# Patient Record
Sex: FEMALE | Race: BLACK OR AFRICAN AMERICAN | NOT HISPANIC OR LATINO | Employment: FULL TIME | URBAN - METROPOLITAN AREA
[De-identification: names, ages, dates, MRNs, and addresses within clinical notes are randomized per-mention and may not be internally consistent; named-entity substitution may affect disease eponyms.]

---

## 2017-05-08 PROBLEM — Z98.890 S/P TENDON REPAIR: Status: ACTIVE | Noted: 2017-05-08

## 2021-01-04 ENCOUNTER — OFFICE VISIT (OUTPATIENT)
Dept: INTERNAL MEDICINE | Facility: CLINIC | Age: 51
End: 2021-01-04
Payer: COMMERCIAL

## 2021-01-04 ENCOUNTER — PATIENT MESSAGE (OUTPATIENT)
Dept: INTERNAL MEDICINE | Facility: CLINIC | Age: 51
End: 2021-01-04

## 2021-01-04 VITALS
SYSTOLIC BLOOD PRESSURE: 131 MMHG | OXYGEN SATURATION: 98 % | HEART RATE: 72 BPM | RESPIRATION RATE: 16 BRPM | HEIGHT: 72 IN | TEMPERATURE: 100 F | DIASTOLIC BLOOD PRESSURE: 90 MMHG | WEIGHT: 192 LBS | BODY MASS INDEX: 26.01 KG/M2

## 2021-01-04 DIAGNOSIS — J45.30 MILD PERSISTENT ASTHMA WITHOUT COMPLICATION: ICD-10-CM

## 2021-01-04 DIAGNOSIS — I10 HYPERTENSION, UNSPECIFIED TYPE: Primary | ICD-10-CM

## 2021-01-04 PROCEDURE — 3008F BODY MASS INDEX DOCD: CPT | Mod: CPTII,S$GLB,, | Performed by: NURSE PRACTITIONER

## 2021-01-04 PROCEDURE — 3075F PR MOST RECENT SYSTOLIC BLOOD PRESS GE 130-139MM HG: ICD-10-PCS | Mod: CPTII,S$GLB,, | Performed by: NURSE PRACTITIONER

## 2021-01-04 PROCEDURE — 99204 PR OFFICE/OUTPT VISIT, NEW, LEVL IV, 45-59 MIN: ICD-10-PCS | Mod: S$GLB,,, | Performed by: NURSE PRACTITIONER

## 2021-01-04 PROCEDURE — 3080F PR MOST RECENT DIASTOLIC BLOOD PRESSURE >= 90 MM HG: ICD-10-PCS | Mod: CPTII,S$GLB,, | Performed by: NURSE PRACTITIONER

## 2021-01-04 PROCEDURE — 3075F SYST BP GE 130 - 139MM HG: CPT | Mod: CPTII,S$GLB,, | Performed by: NURSE PRACTITIONER

## 2021-01-04 PROCEDURE — 99999 PR PBB SHADOW E&M-NEW PATIENT-LVL V: ICD-10-PCS | Mod: PBBFAC,,, | Performed by: NURSE PRACTITIONER

## 2021-01-04 PROCEDURE — 99999 PR PBB SHADOW E&M-NEW PATIENT-LVL V: CPT | Mod: PBBFAC,,, | Performed by: NURSE PRACTITIONER

## 2021-01-04 PROCEDURE — 99204 OFFICE O/P NEW MOD 45 MIN: CPT | Mod: S$GLB,,, | Performed by: NURSE PRACTITIONER

## 2021-01-04 PROCEDURE — 3008F PR BODY MASS INDEX (BMI) DOCUMENTED: ICD-10-PCS | Mod: CPTII,S$GLB,, | Performed by: NURSE PRACTITIONER

## 2021-01-04 PROCEDURE — 3080F DIAST BP >= 90 MM HG: CPT | Mod: CPTII,S$GLB,, | Performed by: NURSE PRACTITIONER

## 2021-01-04 RX ORDER — IBUPROFEN 800 MG/1
TABLET ORAL
COMMUNITY
End: 2021-01-26

## 2021-01-04 RX ORDER — LISINOPRIL 20 MG/1
TABLET ORAL
COMMUNITY
End: 2021-01-04 | Stop reason: SDUPTHER

## 2021-01-04 RX ORDER — FUROSEMIDE 20 MG/1
20 TABLET ORAL
COMMUNITY
Start: 2020-12-03

## 2021-01-04 RX ORDER — LISINOPRIL 20 MG/1
20 TABLET ORAL DAILY
Qty: 90 TABLET | Refills: 1 | Status: SHIPPED | OUTPATIENT
Start: 2021-01-04 | End: 2022-04-05 | Stop reason: ALTCHOICE

## 2021-01-04 RX ORDER — ALBUTEROL SULFATE 90 UG/1
2 AEROSOL, METERED RESPIRATORY (INHALATION) EVERY 6 HOURS PRN
Qty: 18 G | Refills: 2 | Status: SHIPPED | OUTPATIENT
Start: 2021-01-04 | End: 2022-04-05 | Stop reason: SDUPTHER

## 2021-01-04 RX ORDER — NIFEDIPINE 30 MG/1
90 TABLET, EXTENDED RELEASE ORAL DAILY
Qty: 90 TABLET | Refills: 1 | Status: SHIPPED | OUTPATIENT
Start: 2021-01-04 | End: 2022-04-05

## 2021-01-04 RX ORDER — CYCLOBENZAPRINE HCL 10 MG
TABLET ORAL
COMMUNITY
End: 2021-01-26

## 2021-01-26 ENCOUNTER — OFFICE VISIT (OUTPATIENT)
Dept: INTERNAL MEDICINE | Facility: CLINIC | Age: 51
End: 2021-01-26
Payer: COMMERCIAL

## 2021-01-26 VITALS
OXYGEN SATURATION: 99 % | HEART RATE: 79 BPM | DIASTOLIC BLOOD PRESSURE: 102 MMHG | BODY MASS INDEX: 26.49 KG/M2 | TEMPERATURE: 98 F | WEIGHT: 195.31 LBS | SYSTOLIC BLOOD PRESSURE: 146 MMHG

## 2021-01-26 DIAGNOSIS — I16.0 HYPERTENSIVE URGENCY: Primary | ICD-10-CM

## 2021-01-26 DIAGNOSIS — Z29.9 PREVENTIVE MEASURE: ICD-10-CM

## 2021-01-26 DIAGNOSIS — M50.30 DDD (DEGENERATIVE DISC DISEASE), CERVICAL: ICD-10-CM

## 2021-01-26 DIAGNOSIS — Z98.1 STATUS POST CERVICAL SPINAL FUSION: ICD-10-CM

## 2021-01-26 DIAGNOSIS — M75.51 BILATERAL SHOULDER BURSITIS: ICD-10-CM

## 2021-01-26 DIAGNOSIS — J45.30 MILD PERSISTENT ASTHMA WITHOUT COMPLICATION: ICD-10-CM

## 2021-01-26 DIAGNOSIS — M75.52 BILATERAL SHOULDER BURSITIS: ICD-10-CM

## 2021-01-26 DIAGNOSIS — I10 ESSENTIAL HYPERTENSION: ICD-10-CM

## 2021-01-26 PROCEDURE — 99999 PR PBB SHADOW E&M-EST. PATIENT-LVL V: CPT | Mod: PBBFAC,,, | Performed by: FAMILY MEDICINE

## 2021-01-26 PROCEDURE — 99214 OFFICE O/P EST MOD 30 MIN: CPT | Mod: S$GLB,,, | Performed by: FAMILY MEDICINE

## 2021-01-26 PROCEDURE — 1125F AMNT PAIN NOTED PAIN PRSNT: CPT | Mod: S$GLB,,, | Performed by: FAMILY MEDICINE

## 2021-01-26 PROCEDURE — 99999 PR PBB SHADOW E&M-EST. PATIENT-LVL V: ICD-10-PCS | Mod: PBBFAC,,, | Performed by: FAMILY MEDICINE

## 2021-01-26 PROCEDURE — 3008F PR BODY MASS INDEX (BMI) DOCUMENTED: ICD-10-PCS | Mod: CPTII,S$GLB,, | Performed by: FAMILY MEDICINE

## 2021-01-26 PROCEDURE — 3080F PR MOST RECENT DIASTOLIC BLOOD PRESSURE >= 90 MM HG: ICD-10-PCS | Mod: CPTII,S$GLB,, | Performed by: FAMILY MEDICINE

## 2021-01-26 PROCEDURE — 3080F DIAST BP >= 90 MM HG: CPT | Mod: CPTII,S$GLB,, | Performed by: FAMILY MEDICINE

## 2021-01-26 PROCEDURE — 1125F PR PAIN SEVERITY QUANTIFIED, PAIN PRESENT: ICD-10-PCS | Mod: S$GLB,,, | Performed by: FAMILY MEDICINE

## 2021-01-26 PROCEDURE — 3077F PR MOST RECENT SYSTOLIC BLOOD PRESSURE >= 140 MM HG: ICD-10-PCS | Mod: CPTII,S$GLB,, | Performed by: FAMILY MEDICINE

## 2021-01-26 PROCEDURE — 3077F SYST BP >= 140 MM HG: CPT | Mod: CPTII,S$GLB,, | Performed by: FAMILY MEDICINE

## 2021-01-26 PROCEDURE — 3008F BODY MASS INDEX DOCD: CPT | Mod: CPTII,S$GLB,, | Performed by: FAMILY MEDICINE

## 2021-01-26 PROCEDURE — 99214 PR OFFICE/OUTPT VISIT, EST, LEVL IV, 30-39 MIN: ICD-10-PCS | Mod: S$GLB,,, | Performed by: FAMILY MEDICINE

## 2021-01-26 RX ORDER — MELOXICAM 15 MG/1
TABLET ORAL
COMMUNITY
End: 2023-09-12

## 2021-01-26 RX ORDER — CLOTRIMAZOLE AND BETAMETHASONE DIPROPIONATE 10; .64 MG/G; MG/G
CREAM TOPICAL
COMMUNITY
Start: 2020-12-03

## 2021-01-26 RX ORDER — MUPIROCIN 20 MG/G
OINTMENT TOPICAL
COMMUNITY
End: 2023-10-24 | Stop reason: ALTCHOICE

## 2021-01-29 ENCOUNTER — HOSPITAL ENCOUNTER (OUTPATIENT)
Dept: RADIOLOGY | Facility: HOSPITAL | Age: 51
Discharge: HOME | End: 2021-01-29
Attending: FAMILY MEDICINE
Payer: COMMERCIAL

## 2021-01-29 ENCOUNTER — OFFICE VISIT (OUTPATIENT)
Dept: CARDIOLOGY | Facility: CLINIC | Age: 51
End: 2021-01-29
Payer: COMMERCIAL

## 2021-01-29 ENCOUNTER — LAB VISIT (OUTPATIENT)
Dept: LAB | Facility: HOSPITAL | Age: 51
End: 2021-01-29
Payer: COMMERCIAL

## 2021-01-29 ENCOUNTER — HOSPITAL ENCOUNTER (OUTPATIENT)
Dept: CARDIOLOGY | Facility: HOSPITAL | Age: 51
Discharge: HOME | End: 2021-01-29
Payer: COMMERCIAL

## 2021-01-29 VITALS
OXYGEN SATURATION: 100 % | SYSTOLIC BLOOD PRESSURE: 122 MMHG | BODY MASS INDEX: 26.41 KG/M2 | HEART RATE: 79 BPM | DIASTOLIC BLOOD PRESSURE: 84 MMHG | WEIGHT: 195 LBS | HEIGHT: 72 IN

## 2021-01-29 DIAGNOSIS — I16.0 HYPERTENSIVE URGENCY: ICD-10-CM

## 2021-01-29 DIAGNOSIS — Z29.9 PREVENTIVE MEASURE: ICD-10-CM

## 2021-01-29 DIAGNOSIS — I10 ESSENTIAL HYPERTENSION: ICD-10-CM

## 2021-01-29 DIAGNOSIS — J45.30 MILD PERSISTENT ASTHMA WITHOUT COMPLICATION: ICD-10-CM

## 2021-01-29 DIAGNOSIS — R07.9 CHEST PAIN SYNDROME: Primary | ICD-10-CM

## 2021-01-29 LAB
BASOPHILS # BLD AUTO: 0.08 K/UL (ref 0–0.2)
BASOPHILS NFR BLD: 1.1 % (ref 0–1.9)
BILIRUB UR QL STRIP: NEGATIVE
CLARITY UR: CLEAR
COLOR UR: YELLOW
DIFFERENTIAL METHOD: ABNORMAL
EOSINOPHIL # BLD AUTO: 0 K/UL (ref 0–0.5)
EOSINOPHIL NFR BLD: 0.4 % (ref 0–8)
ERYTHROCYTE [DISTWIDTH] IN BLOOD BY AUTOMATED COUNT: 13.2 % (ref 11.5–14.5)
GLUCOSE UR QL STRIP: NEGATIVE
HCT VFR BLD AUTO: 40.6 % (ref 37–48.5)
HGB BLD-MCNC: 13.4 G/DL (ref 12–16)
HGB UR QL STRIP: ABNORMAL
IMM GRANULOCYTES # BLD AUTO: 0.01 K/UL (ref 0–0.04)
IMM GRANULOCYTES NFR BLD AUTO: 0.1 % (ref 0–0.5)
KETONES UR QL STRIP: NEGATIVE
LEUKOCYTE ESTERASE UR QL STRIP: NEGATIVE
LYMPHOCYTES # BLD AUTO: 2 K/UL (ref 1–4.8)
LYMPHOCYTES NFR BLD: 25.8 % (ref 18–48)
MCH RBC QN AUTO: 30 PG (ref 27–31)
MCHC RBC AUTO-ENTMCNC: 33 G/DL (ref 32–36)
MCV RBC AUTO: 91 FL (ref 82–98)
MICROSCOPIC COMMENT: ABNORMAL
MONOCYTES # BLD AUTO: 0.4 K/UL (ref 0.3–1)
MONOCYTES NFR BLD: 5 % (ref 4–15)
NEUTROPHILS # BLD AUTO: 5.1 K/UL (ref 1.8–7.7)
NEUTROPHILS NFR BLD: 67.6 % (ref 38–73)
NITRITE UR QL STRIP: NEGATIVE
NRBC BLD-RTO: 0 /100 WBC
PH UR STRIP: 7 [PH] (ref 5–8)
PLATELET # BLD AUTO: 417 K/UL (ref 150–350)
PMV BLD AUTO: 10.9 FL (ref 9.2–12.9)
PROT UR QL STRIP: NEGATIVE
RBC # BLD AUTO: 4.46 M/UL (ref 4–5.4)
RBC #/AREA URNS HPF: 5 /HPF (ref 0–4)
SP GR UR STRIP: 1.01 (ref 1–1.03)
SQUAMOUS #/AREA URNS HPF: 2 /HPF
URN SPEC COLLECT METH UR: ABNORMAL
WBC # BLD AUTO: 7.57 K/UL (ref 3.9–12.7)
WBC #/AREA URNS HPF: 2 /HPF (ref 0–5)

## 2021-01-29 PROCEDURE — 3008F BODY MASS INDEX DOCD: CPT | Mod: CPTII,S$GLB,, | Performed by: INTERNAL MEDICINE

## 2021-01-29 PROCEDURE — 3074F SYST BP LT 130 MM HG: CPT | Mod: CPTII,S$GLB,, | Performed by: INTERNAL MEDICINE

## 2021-01-29 PROCEDURE — 99203 PR OFFICE/OUTPT VISIT, NEW, LEVL III, 30-44 MIN: ICD-10-PCS | Mod: S$GLB,,, | Performed by: INTERNAL MEDICINE

## 2021-01-29 PROCEDURE — 36415 COLL VENOUS BLD VENIPUNCTURE: CPT

## 2021-01-29 PROCEDURE — 80053 COMPREHEN METABOLIC PANEL: CPT

## 2021-01-29 PROCEDURE — 84443 ASSAY THYROID STIM HORMONE: CPT

## 2021-01-29 PROCEDURE — 81000 URINALYSIS NONAUTO W/SCOPE: CPT

## 2021-01-29 PROCEDURE — 99999 PR PBB SHADOW E&M-EST. PATIENT-LVL IV: ICD-10-PCS | Mod: PBBFAC,,, | Performed by: INTERNAL MEDICINE

## 2021-01-29 PROCEDURE — 86703 HIV-1/HIV-2 1 RESULT ANTBDY: CPT

## 2021-01-29 PROCEDURE — 3079F PR MOST RECENT DIASTOLIC BLOOD PRESSURE 80-89 MM HG: ICD-10-PCS | Mod: CPTII,S$GLB,, | Performed by: INTERNAL MEDICINE

## 2021-01-29 PROCEDURE — 86803 HEPATITIS C AB TEST: CPT

## 2021-01-29 PROCEDURE — 99203 OFFICE O/P NEW LOW 30 MIN: CPT | Mod: S$GLB,,, | Performed by: INTERNAL MEDICINE

## 2021-01-29 PROCEDURE — 1125F PR PAIN SEVERITY QUANTIFIED, PAIN PRESENT: ICD-10-PCS | Mod: S$GLB,,, | Performed by: INTERNAL MEDICINE

## 2021-01-29 PROCEDURE — 3079F DIAST BP 80-89 MM HG: CPT | Mod: CPTII,S$GLB,, | Performed by: INTERNAL MEDICINE

## 2021-01-29 PROCEDURE — 99999 PR PBB SHADOW E&M-EST. PATIENT-LVL IV: CPT | Mod: PBBFAC,,, | Performed by: INTERNAL MEDICINE

## 2021-01-29 PROCEDURE — 71046 XR CHEST PA AND LATERAL: ICD-10-PCS | Mod: 26,,, | Performed by: RADIOLOGY

## 2021-01-29 PROCEDURE — 93010 ELECTROCARDIOGRAM REPORT: CPT | Mod: ,,, | Performed by: INTERNAL MEDICINE

## 2021-01-29 PROCEDURE — 71046 X-RAY EXAM CHEST 2 VIEWS: CPT | Mod: TC

## 2021-01-29 PROCEDURE — 1125F AMNT PAIN NOTED PAIN PRSNT: CPT | Mod: S$GLB,,, | Performed by: INTERNAL MEDICINE

## 2021-01-29 PROCEDURE — 3008F PR BODY MASS INDEX (BMI) DOCUMENTED: ICD-10-PCS | Mod: CPTII,S$GLB,, | Performed by: INTERNAL MEDICINE

## 2021-01-29 PROCEDURE — 80061 LIPID PANEL: CPT

## 2021-01-29 PROCEDURE — 93005 ELECTROCARDIOGRAM TRACING: CPT

## 2021-01-29 PROCEDURE — 93010 EKG 12-LEAD: ICD-10-PCS | Mod: ,,, | Performed by: INTERNAL MEDICINE

## 2021-01-29 PROCEDURE — 71046 X-RAY EXAM CHEST 2 VIEWS: CPT | Mod: 26,,, | Performed by: RADIOLOGY

## 2021-01-29 PROCEDURE — 85025 COMPLETE CBC W/AUTO DIFF WBC: CPT

## 2021-01-29 PROCEDURE — 3074F PR MOST RECENT SYSTOLIC BLOOD PRESSURE < 130 MM HG: ICD-10-PCS | Mod: CPTII,S$GLB,, | Performed by: INTERNAL MEDICINE

## 2021-01-29 RX ORDER — BACLOFEN 10 MG/1
10 TABLET ORAL DAILY
COMMUNITY

## 2021-01-30 LAB
ALBUMIN SERPL BCP-MCNC: 3.9 G/DL (ref 3.5–5.2)
ALP SERPL-CCNC: 64 U/L (ref 55–135)
ALT SERPL W/O P-5'-P-CCNC: 10 U/L (ref 10–44)
ANION GAP SERPL CALC-SCNC: 9 MMOL/L (ref 8–16)
AST SERPL-CCNC: 15 U/L (ref 10–40)
BILIRUB SERPL-MCNC: 0.4 MG/DL (ref 0.1–1)
BUN SERPL-MCNC: 11 MG/DL (ref 6–20)
CALCIUM SERPL-MCNC: 8.8 MG/DL (ref 8.7–10.5)
CHLORIDE SERPL-SCNC: 108 MMOL/L (ref 95–110)
CHOLEST SERPL-MCNC: 154 MG/DL (ref 120–199)
CHOLEST/HDLC SERPL: 2.9 {RATIO} (ref 2–5)
CO2 SERPL-SCNC: 20 MMOL/L (ref 23–29)
CREAT SERPL-MCNC: 1 MG/DL (ref 0.5–1.4)
EST. GFR  (AFRICAN AMERICAN): >60 ML/MIN/1.73 M^2
EST. GFR  (NON AFRICAN AMERICAN): >60 ML/MIN/1.73 M^2
GLUCOSE SERPL-MCNC: 88 MG/DL (ref 70–110)
HDLC SERPL-MCNC: 53 MG/DL (ref 40–75)
HDLC SERPL: 34.4 % (ref 20–50)
LDLC SERPL CALC-MCNC: 86 MG/DL (ref 63–159)
NONHDLC SERPL-MCNC: 101 MG/DL
POTASSIUM SERPL-SCNC: 3.9 MMOL/L (ref 3.5–5.1)
PROT SERPL-MCNC: 7.8 G/DL (ref 6–8.4)
SODIUM SERPL-SCNC: 137 MMOL/L (ref 136–145)
TRIGL SERPL-MCNC: 75 MG/DL (ref 30–150)
TSH SERPL DL<=0.005 MIU/L-ACNC: 0.7 UIU/ML (ref 0.4–4)

## 2021-02-01 ENCOUNTER — PATIENT MESSAGE (OUTPATIENT)
Dept: CARDIOLOGY | Facility: HOSPITAL | Age: 51
End: 2021-02-01

## 2021-02-01 LAB
HCV AB SERPL QL IA: NEGATIVE
HIV 1+2 AB+HIV1 P24 AG SERPL QL IA: NEGATIVE

## 2021-04-28 ENCOUNTER — PATIENT MESSAGE (OUTPATIENT)
Dept: RESEARCH | Facility: HOSPITAL | Age: 51
End: 2021-04-28

## 2021-05-10 ENCOUNTER — PATIENT OUTREACH (OUTPATIENT)
Dept: ADMINISTRATIVE | Facility: HOSPITAL | Age: 51
End: 2021-05-10

## 2022-04-05 ENCOUNTER — OFFICE VISIT (OUTPATIENT)
Dept: PRIMARY CARE CLINIC | Facility: CLINIC | Age: 52
End: 2022-04-05
Payer: COMMERCIAL

## 2022-04-05 VITALS
OXYGEN SATURATION: 99 % | HEIGHT: 72 IN | BODY MASS INDEX: 28.07 KG/M2 | WEIGHT: 207.25 LBS | TEMPERATURE: 98 F | SYSTOLIC BLOOD PRESSURE: 170 MMHG | RESPIRATION RATE: 18 BRPM | DIASTOLIC BLOOD PRESSURE: 100 MMHG | HEART RATE: 78 BPM

## 2022-04-05 DIAGNOSIS — J45.30 MILD PERSISTENT ASTHMA WITHOUT COMPLICATION: ICD-10-CM

## 2022-04-05 DIAGNOSIS — R21 RASH: Primary | ICD-10-CM

## 2022-04-05 DIAGNOSIS — I10 HYPERTENSION, UNSPECIFIED TYPE: ICD-10-CM

## 2022-04-05 DIAGNOSIS — L29.9 PRURITUS: ICD-10-CM

## 2022-04-05 PROCEDURE — 3080F PR MOST RECENT DIASTOLIC BLOOD PRESSURE >= 90 MM HG: ICD-10-PCS | Mod: CPTII,S$GLB,, | Performed by: NURSE PRACTITIONER

## 2022-04-05 PROCEDURE — 3080F DIAST BP >= 90 MM HG: CPT | Mod: CPTII,S$GLB,, | Performed by: NURSE PRACTITIONER

## 2022-04-05 PROCEDURE — 3077F PR MOST RECENT SYSTOLIC BLOOD PRESSURE >= 140 MM HG: ICD-10-PCS | Mod: CPTII,S$GLB,, | Performed by: NURSE PRACTITIONER

## 2022-04-05 PROCEDURE — 3008F PR BODY MASS INDEX (BMI) DOCUMENTED: ICD-10-PCS | Mod: CPTII,S$GLB,, | Performed by: NURSE PRACTITIONER

## 2022-04-05 PROCEDURE — 99999 PR PBB SHADOW E&M-EST. PATIENT-LVL V: CPT | Mod: PBBFAC,,, | Performed by: NURSE PRACTITIONER

## 2022-04-05 PROCEDURE — 4010F PR ACE/ARB THEARPY RXD/TAKEN: ICD-10-PCS | Mod: CPTII,S$GLB,, | Performed by: NURSE PRACTITIONER

## 2022-04-05 PROCEDURE — 99999 PR PBB SHADOW E&M-EST. PATIENT-LVL V: ICD-10-PCS | Mod: PBBFAC,,, | Performed by: NURSE PRACTITIONER

## 2022-04-05 PROCEDURE — 99214 OFFICE O/P EST MOD 30 MIN: CPT | Mod: 25,S$GLB,, | Performed by: NURSE PRACTITIONER

## 2022-04-05 PROCEDURE — 99214 PR OFFICE/OUTPT VISIT, EST, LEVL IV, 30-39 MIN: ICD-10-PCS | Mod: 25,S$GLB,, | Performed by: NURSE PRACTITIONER

## 2022-04-05 PROCEDURE — 4010F ACE/ARB THERAPY RXD/TAKEN: CPT | Mod: CPTII,S$GLB,, | Performed by: NURSE PRACTITIONER

## 2022-04-05 PROCEDURE — 3077F SYST BP >= 140 MM HG: CPT | Mod: CPTII,S$GLB,, | Performed by: NURSE PRACTITIONER

## 2022-04-05 PROCEDURE — 96372 THER/PROPH/DIAG INJ SC/IM: CPT | Mod: S$GLB,,, | Performed by: NURSE PRACTITIONER

## 2022-04-05 PROCEDURE — 1159F MED LIST DOCD IN RCRD: CPT | Mod: CPTII,S$GLB,, | Performed by: NURSE PRACTITIONER

## 2022-04-05 PROCEDURE — 96372 PR INJECTION,THERAP/PROPH/DIAG2ST, IM OR SUBCUT: ICD-10-PCS | Mod: S$GLB,,, | Performed by: NURSE PRACTITIONER

## 2022-04-05 PROCEDURE — 1159F PR MEDICATION LIST DOCUMENTED IN MEDICAL RECORD: ICD-10-PCS | Mod: CPTII,S$GLB,, | Performed by: NURSE PRACTITIONER

## 2022-04-05 PROCEDURE — 3008F BODY MASS INDEX DOCD: CPT | Mod: CPTII,S$GLB,, | Performed by: NURSE PRACTITIONER

## 2022-04-05 RX ORDER — LORATADINE 10 MG/1
10 TABLET ORAL DAILY
Qty: 14 TABLET | Refills: 0 | Status: SHIPPED | OUTPATIENT
Start: 2022-04-05 | End: 2022-05-07

## 2022-04-05 RX ORDER — ALBUTEROL SULFATE 90 UG/1
2 AEROSOL, METERED RESPIRATORY (INHALATION) EVERY 6 HOURS PRN
Qty: 18 G | Refills: 2 | Status: SHIPPED | OUTPATIENT
Start: 2022-04-05 | End: 2023-10-24 | Stop reason: ALTCHOICE

## 2022-04-05 RX ORDER — LOSARTAN POTASSIUM 50 MG/1
50 TABLET ORAL DAILY
Qty: 30 TABLET | Refills: 11 | Status: SHIPPED | OUTPATIENT
Start: 2022-04-05 | End: 2023-09-12 | Stop reason: SDUPTHER

## 2022-04-05 RX ORDER — NIFEDIPINE 90 MG/1
90 TABLET, EXTENDED RELEASE ORAL DAILY
Qty: 30 TABLET | Refills: 11 | Status: SHIPPED | OUTPATIENT
Start: 2022-04-05 | End: 2022-05-03 | Stop reason: SDUPTHER

## 2022-04-05 RX ORDER — ACETAMINOPHEN 325 MG/1
325 TABLET ORAL EVERY 6 HOURS PRN
COMMUNITY

## 2022-04-05 RX ORDER — NAPROXEN SODIUM 220 MG
220 TABLET ORAL
COMMUNITY
End: 2023-09-12

## 2022-04-05 RX ORDER — BETAMETHASONE SODIUM PHOSPHATE AND BETAMETHASONE ACETATE 3; 3 MG/ML; MG/ML
6 INJECTION, SUSPENSION INTRA-ARTICULAR; INTRALESIONAL; INTRAMUSCULAR; SOFT TISSUE
Status: COMPLETED | OUTPATIENT
Start: 2022-04-05 | End: 2022-04-05

## 2022-04-05 RX ORDER — AMLODIPINE BESYLATE 5 MG/1
5 TABLET ORAL DAILY
COMMUNITY
Start: 2022-02-08 | End: 2022-04-05 | Stop reason: ALTCHOICE

## 2022-04-05 RX ORDER — PREDNISONE 20 MG/1
TABLET ORAL
Qty: 20 TABLET | Refills: 0 | Status: SHIPPED | OUTPATIENT
Start: 2022-04-05 | End: 2022-04-17

## 2022-04-05 RX ORDER — FAMOTIDINE 20 MG/1
20 TABLET, FILM COATED ORAL 2 TIMES DAILY
Qty: 14 TABLET | Refills: 0 | Status: SHIPPED | OUTPATIENT
Start: 2022-04-05 | End: 2022-05-03 | Stop reason: SDUPTHER

## 2022-04-05 RX ADMIN — BETAMETHASONE SODIUM PHOSPHATE AND BETAMETHASONE ACETATE 6 MG: 3; 3 INJECTION, SUSPENSION INTRA-ARTICULAR; INTRALESIONAL; INTRAMUSCULAR; SOFT TISSUE at 11:04

## 2022-04-05 NOTE — LETTER
April 5, 2022      [facility]  [address]  [place] [address]  Phone: [phone]  Fax: [phone]       Patient: Konrad Christianson   YOB: 1970  Date of Visit: 04/05/2022    To Whom It May Concern:    Angelina Christianson  was at [facility] on 04/05/2022. The patient may return to work/school on 4/6/22 with no restrictions. If you have any questions or concerns, or if I can be of further assistance, please do not hesitate to contact me.    Sincerely,    Alpa Kathleen, NP

## 2022-04-05 NOTE — PROGRESS NOTES
Disclaimer:  This note is prepared using voice recognition software and as such is likely to have errors and has not been proof read. Please contact me for questions.    Subjective:      Patient ID: Konrad Christianson is a 51 y.o. female.    Chief Complaint: Allergic Reaction (Back, both arms, both legs, lip, chin, chest/Pt states she's having a reaction to Amlodipine)    Ms. Christianson presents to clinic today with complaints of rash all over. This has been going on since Feb. She was reportedly switched from nifedipine to amlodipine by Urgent Care back in Jan. Went to urgent care in early March and it was assumed that the rash might be related to the amlodipine, so they advised her to switch back to nifedipine. She was also treated with prednisone and hydroxyzine at that time. Couldn't take the hydroxyzine because it made her too drowsy. No other known exposure to new soaps, lotions, detergents, etc. Uses lubriderm. Drinks plenty of fluids. Cool compresses and cool showers help with symptoms. Heat worsens symptoms. No other identifiable triggers. Denies any changes in diet. Takes benadryl PRN, but this also makes her drowsy so not really an option when she's at work. No difficulty speaking, swallowing or breathing. No intraoral or facial swelling.    PCP was Dr. Ariza, but she'd like to switch to [facility]. She's seen Dr. Hdez previously. Also saw Dr. Fairchild in cardiology last year for chest pain and shortness of breath. BP is up today. She hadn't taken her medication. She's prescribed nifedipine 30 mg TID, but can't remember to take it TID. Wanting to go to 90 mg QD if possible. She also has lisinopril listed on her medication list but hasn't taken that one in a while as she had swelling with the lisinopril and was told she's allergic to it. I added it to her allergy list.       Review of Systems   Constitutional: Negative.    HENT: Negative.    Eyes: Negative.    Respiratory: Negative.    Cardiovascular: Negative.     Gastrointestinal: Negative.    Genitourinary: Negative.    Musculoskeletal: Negative.    Skin: Positive for color change (gets red splotches randomly or if she runs her finger across her skin) and rash.   Neurological: Negative.    Hematological: Negative.    Psychiatric/Behavioral: Positive for sleep disturbance (due to itching).       Objective:   BP (!) 170/100   Pulse 78   Temp 98.4 °F (36.9 °C) (Temporal)   Resp 18   Ht 6' (1.829 m)   Wt 94 kg (207 lb 3.7 oz)   LMP  (LMP Unknown) Comment: LMP in 2016  SpO2 99%   BMI 28.11 kg/m²   Physical Exam  Constitutional:       Appearance: Normal appearance. She is not ill-appearing, toxic-appearing or diaphoretic.   HENT:      Head: Normocephalic and atraumatic.      Right Ear: External ear normal.      Left Ear: External ear normal.      Nose: Nose normal.      Mouth/Throat:      Mouth: Mucous membranes are moist.      Pharynx: Oropharynx is clear. No oropharyngeal exudate or posterior oropharyngeal erythema.   Eyes:      Extraocular Movements: Extraocular movements intact.      Conjunctiva/sclera: Conjunctivae normal.   Cardiovascular:      Rate and Rhythm: Normal rate.      Heart sounds: Normal heart sounds.   Pulmonary:      Effort: Pulmonary effort is normal.      Breath sounds: Normal breath sounds.   Musculoskeletal:      Cervical back: Normal range of motion and neck supple.   Skin:     General: Skin is warm.      Capillary Refill: Capillary refill takes less than 2 seconds.      Findings: Rash present.      Comments: Faint erythematous macular rash to chest, has some raised whelps to the back. No other primary or secondary lesions noted on exam.   Neurological:      General: No focal deficit present.      Mental Status: She is alert and oriented to person, place, and time.   Psychiatric:         Attention and Perception: Attention normal.         Mood and Affect: Mood is anxious.         Behavior: Behavior normal.         Thought Content: Thought  content normal.       Assessment:     1. Rash    2. Mild persistent asthma without complication    3. Hypertension, unspecified type    4. Pruritus       Plan:   Rash  -     Ambulatory referral/consult to Dermatology; Future; Expected date: 04/12/2022  -     betamethasone acetate-betamethasone sodium phosphate injection 6 mg  -     famotidine (PEPCID AC) 20 MG tablet; Take 1 tablet (20 mg total) by mouth 2 (two) times daily. for 7 days  Dispense: 14 tablet; Refill: 0  -     predniSONE (DELTASONE) 20 MG tablet; Take 3 tabs by mouth on days 1-3. Take two pills on days 4-6. Take one pill on days 7-9. Then take 1/2 pill by mouth on days 9-12.  Dispense: 20 tablet; Refill: 0  -     loratadine (CLARITIN) 10 mg tablet; Take 1 tablet (10 mg total) by mouth once daily. for 14 days  Dispense: 14 tablet; Refill: 0    Mild persistent asthma without complication  -     albuterol (PROAIR HFA) 90 mcg/actuation inhaler; Inhale 2 puffs into the lungs every 6 (six) hours as needed for Wheezing.  Dispense: 18 g; Refill: 2    Hypertension, unspecified type  -     albuterol (PROAIR HFA) 90 mcg/actuation inhaler; Inhale 2 puffs into the lungs every 6 (six) hours as needed for Wheezing.  Dispense: 18 g; Refill: 2  -     NIFEdipine (PROCARDIA-XL) 90 MG (OSM) 24 hr tablet; Take 1 tablet (90 mg total) by mouth once daily.  Dispense: 30 tablet; Refill: 11    Pruritus  -     Ambulatory referral/consult to Dermatology; Future; Expected date: 04/12/2022  -     betamethasone acetate-betamethasone sodium phosphate injection 6 mg  -     famotidine (PEPCID AC) 20 MG tablet; Take 1 tablet (20 mg total) by mouth 2 (two) times daily. for 7 days  Dispense: 14 tablet; Refill: 0  -     predniSONE (DELTASONE) 20 MG tablet; Take 3 tabs by mouth on days 1-3. Take two pills on days 4-6. Take one pill on days 7-9. Then take 1/2 pill by mouth on days 9-12.  Dispense: 20 tablet; Refill: 0  -     loratadine (CLARITIN) 10 mg tablet; Take 1 tablet (10 mg total) by  mouth once daily. for 14 days  Dispense: 14 tablet; Refill: 0    Other orders  -     losartan (COZAAR) 50 MG tablet; Take 1 tablet (50 mg total) by mouth once daily.  Dispense: 30 tablet; Refill: 11    Continue Nifedipine, can switch from 30 mg TID to 90 mg QD due to difficulty with compliance with TID dosing. Also switch lisinopril to losartan and can plan to recheck in 2-4 weeks with primary care to ensure BP is improving.  Needing to do systemic steroid for the rash, but BP up today. Hadn't been taking BP meds recently. Will go with 6 mg Celestone instead of higher dosing and get BP meds restarted.  Has Rx for hydroxyzine already. Can use that before bed if needed for itching. Claritin daily in a.m. Will also start on H2 blocker - famotidine BID.  Follow up with Derm. May also need allergist if can't identify cause.  Home care instructions per visit summary.      No follow-ups on file.    Patient Instructions   Hold the prednisone and start it tomorrow. Make sure to watch the blood pressure and if your readings remain elevated, we may need to adjust dosing or hold the prednisone. Notify us if your BP remains >160/95. Make sure to take your BP mediactions as prescribed and watch the sodium in the diet and watch caffeine intake.    · Use only dye-free, fragrance-free, sensitive skin detergents for laundry and bedding until the rash resolves completely. Some good options include the following:  All Free and Clear, Tide Free and Gentle, Seventh Generation Free and Clear, Arm and Hammer Free of Perfumes and Dyes, and Ultra Purex Hypoallergenic Free and Clear Laundry Detergent.   · Eucerin, CeraVe Moisturizing Cream, or Vanicream moisturizer to affected areas twice daily. May also use an emollient such as Vaseline or Aquaphor to areas that are especially dry.  · Any product with added fragrance, drying alcohols, or essential oils (even organic or natural fruit extracts) can worsen skin rashes. This is true even if they  have been previously tolerated without any issues in the past.  · Avoid hot water or getting overheated as this can worsen the appearance of the rash and can increase itching.   · Claritin in the mornings. Can take Hydroxyzine or Benadryl before bed if needed for itching.   · May also use cool compresses if needed for itching.   · Follow up with dermatology.

## 2022-04-05 NOTE — PATIENT INSTRUCTIONS
Hold the prednisone and start it tomorrow. Make sure to watch the blood pressure and if your readings remain elevated, we may need to adjust dosing or hold the prednisone. Notify us if your BP remains >160/95. Make sure to take your BP mediactions as prescribed and watch the sodium in the diet and watch caffeine intake.    Use only dye-free, fragrance-free, sensitive skin detergents for laundry and bedding until the rash resolves completely. Some good options include the following:  All Free and Clear, Tide Free and Gentle, Seventh Generation Free and Clear, Arm and Hammer Free of Perfumes and Dyes, and Ultra Purex Hypoallergenic Free and Clear Laundry Detergent.   Eucerin, CeraVe Moisturizing Cream, or Vanicream moisturizer to affected areas twice daily. May also use an emollient such as Vaseline or Aquaphor to areas that are especially dry.  Any product with added fragrance, drying alcohols, or essential oils (even organic or natural fruit extracts) can worsen skin rashes. This is true even if they have been previously tolerated without any issues in the past.  Avoid hot water or getting overheated as this can worsen the appearance of the rash and can increase itching.   Claritin in the mornings. Can take Hydroxyzine or Benadryl before bed if needed for itching.   May also use cool compresses if needed for itching.   Follow up with dermatology.

## 2022-05-03 ENCOUNTER — OFFICE VISIT (OUTPATIENT)
Dept: PRIMARY CARE CLINIC | Facility: CLINIC | Age: 52
End: 2022-05-03
Payer: COMMERCIAL

## 2022-05-03 VITALS
BODY MASS INDEX: 28.25 KG/M2 | TEMPERATURE: 97 F | WEIGHT: 208.56 LBS | OXYGEN SATURATION: 98 % | HEIGHT: 72 IN | HEART RATE: 95 BPM | DIASTOLIC BLOOD PRESSURE: 90 MMHG | RESPIRATION RATE: 18 BRPM | SYSTOLIC BLOOD PRESSURE: 130 MMHG

## 2022-05-03 DIAGNOSIS — L29.9 PRURITUS: ICD-10-CM

## 2022-05-03 DIAGNOSIS — R21 RASH: ICD-10-CM

## 2022-05-03 DIAGNOSIS — M25.511 ACUTE PAIN OF RIGHT SHOULDER: Primary | ICD-10-CM

## 2022-05-03 DIAGNOSIS — I10 HYPERTENSION, UNSPECIFIED TYPE: ICD-10-CM

## 2022-05-03 DIAGNOSIS — Z12.31 ENCOUNTER FOR SCREENING MAMMOGRAM FOR MALIGNANT NEOPLASM OF BREAST: ICD-10-CM

## 2022-05-03 PROCEDURE — 3080F PR MOST RECENT DIASTOLIC BLOOD PRESSURE >= 90 MM HG: ICD-10-PCS | Mod: CPTII,S$GLB,, | Performed by: NURSE PRACTITIONER

## 2022-05-03 PROCEDURE — 4010F PR ACE/ARB THEARPY RXD/TAKEN: ICD-10-PCS | Mod: CPTII,S$GLB,, | Performed by: NURSE PRACTITIONER

## 2022-05-03 PROCEDURE — 3008F BODY MASS INDEX DOCD: CPT | Mod: CPTII,S$GLB,, | Performed by: NURSE PRACTITIONER

## 2022-05-03 PROCEDURE — 1160F RVW MEDS BY RX/DR IN RCRD: CPT | Mod: CPTII,S$GLB,, | Performed by: NURSE PRACTITIONER

## 2022-05-03 PROCEDURE — 99214 PR OFFICE/OUTPT VISIT, EST, LEVL IV, 30-39 MIN: ICD-10-PCS | Mod: S$GLB,,, | Performed by: NURSE PRACTITIONER

## 2022-05-03 PROCEDURE — 3080F DIAST BP >= 90 MM HG: CPT | Mod: CPTII,S$GLB,, | Performed by: NURSE PRACTITIONER

## 2022-05-03 PROCEDURE — 4010F ACE/ARB THERAPY RXD/TAKEN: CPT | Mod: CPTII,S$GLB,, | Performed by: NURSE PRACTITIONER

## 2022-05-03 PROCEDURE — 99214 OFFICE O/P EST MOD 30 MIN: CPT | Mod: S$GLB,,, | Performed by: NURSE PRACTITIONER

## 2022-05-03 PROCEDURE — 1159F MED LIST DOCD IN RCRD: CPT | Mod: CPTII,S$GLB,, | Performed by: NURSE PRACTITIONER

## 2022-05-03 PROCEDURE — 3075F PR MOST RECENT SYSTOLIC BLOOD PRESS GE 130-139MM HG: ICD-10-PCS | Mod: CPTII,S$GLB,, | Performed by: NURSE PRACTITIONER

## 2022-05-03 PROCEDURE — 3075F SYST BP GE 130 - 139MM HG: CPT | Mod: CPTII,S$GLB,, | Performed by: NURSE PRACTITIONER

## 2022-05-03 PROCEDURE — 1160F PR REVIEW ALL MEDS BY PRESCRIBER/CLIN PHARMACIST DOCUMENTED: ICD-10-PCS | Mod: CPTII,S$GLB,, | Performed by: NURSE PRACTITIONER

## 2022-05-03 PROCEDURE — 99999 PR PBB SHADOW E&M-EST. PATIENT-LVL IV: CPT | Mod: PBBFAC,,, | Performed by: NURSE PRACTITIONER

## 2022-05-03 PROCEDURE — 99999 PR PBB SHADOW E&M-EST. PATIENT-LVL IV: ICD-10-PCS | Mod: PBBFAC,,, | Performed by: NURSE PRACTITIONER

## 2022-05-03 PROCEDURE — 1159F PR MEDICATION LIST DOCUMENTED IN MEDICAL RECORD: ICD-10-PCS | Mod: CPTII,S$GLB,, | Performed by: NURSE PRACTITIONER

## 2022-05-03 PROCEDURE — 3008F PR BODY MASS INDEX (BMI) DOCUMENTED: ICD-10-PCS | Mod: CPTII,S$GLB,, | Performed by: NURSE PRACTITIONER

## 2022-05-03 RX ORDER — NIFEDIPINE 90 MG/1
90 TABLET, EXTENDED RELEASE ORAL DAILY
Qty: 30 TABLET | Refills: 11 | Status: SHIPPED | OUTPATIENT
Start: 2022-05-03 | End: 2023-09-12 | Stop reason: SDUPTHER

## 2022-05-03 RX ORDER — FAMOTIDINE 20 MG/1
20 TABLET, FILM COATED ORAL 2 TIMES DAILY
Qty: 14 TABLET | Refills: 0 | Status: SHIPPED | OUTPATIENT
Start: 2022-05-03 | End: 2022-05-10

## 2022-05-03 NOTE — PROGRESS NOTES
Disclaimer:  This note is prepared using voice recognition software and as such is likely to have errors and has not been proof read. Please contact me for questions.    Subjective:      Patient ID: Konrad Christianson is a 51 y.o. female.    Chief Complaint: Follow-up    Itching is much better. BP doing good on the nifedipine. Much easier to remember now with QD dosing instead of TID.     Right shoulder pain lately. Bad for about 1.5 weeks. Has had surgery in the past (most recently seen by ortho in 2017). R hand dominant. Not able to lift much. Can lift passively, but any active abduction is painful. No recent trauma that she can recall, but could have hit it on something without remembering. Sees Dr. Peñaloza in [place] for the neck (workmans comp injury).    Follow-up  Associated symptoms include arthralgias.       Review of Systems   Constitutional: Negative.    HENT: Negative.    Eyes: Negative.    Respiratory: Negative.    Cardiovascular: Negative.    Gastrointestinal: Negative.    Genitourinary: Negative.    Musculoskeletal: Positive for arthralgias. Back pain: right.   Skin:        Generalized pruritis, improving a lot since last visit   Hematological: Negative.        Objective:   BP (!) 130/90   Pulse 95   Temp 97 °F (36.1 °C) (Temporal)   Resp 18   Ht 6' (1.829 m)   Wt 94.6 kg (208 lb 8.9 oz)   LMP  (LMP Unknown) Comment: LMP in 2016  SpO2 98%   BMI 28.29 kg/m²   Physical Exam  Constitutional:       Appearance: Normal appearance. She is not ill-appearing, toxic-appearing or diaphoretic.   HENT:      Head: Normocephalic and atraumatic.      Right Ear: External ear normal.      Left Ear: External ear normal.      Nose: Nose normal.      Mouth/Throat:      Mouth: Mucous membranes are moist.      Pharynx: Oropharynx is clear. No oropharyngeal exudate or posterior oropharyngeal erythema.   Eyes:      Extraocular Movements: Extraocular movements intact.      Conjunctiva/sclera: Conjunctivae normal.    Cardiovascular:      Rate and Rhythm: Normal rate.      Heart sounds: Normal heart sounds.   Pulmonary:      Effort: Pulmonary effort is normal.      Breath sounds: Normal breath sounds.   Musculoskeletal:         General: Tenderness (generalized bony, muscular, and soft tissue tenderness to the right shoulder; good bicep and  strength; limited active abduction >45 degrees, normal passive ROM) present.      Cervical back: Normal range of motion and neck supple.   Skin:     General: Skin is warm.      Capillary Refill: Capillary refill takes less than 2 seconds.      Findings: No rash.      Comments: Faint erythematous macular rash to chest, has some raised whelps to the back. No other primary or secondary lesions noted on exam.   Neurological:      General: No focal deficit present.      Mental Status: She is alert and oriented to person, place, and time.   Psychiatric:         Attention and Perception: Attention normal.         Mood and Affect: Mood is anxious.         Behavior: Behavior normal.         Thought Content: Thought content normal.       Assessment:     1. Acute pain of right shoulder    2. Hypertension, unspecified type    3. Rash    4. Pruritus    5. Encounter for screening mammogram for malignant neoplasm of breast       Plan:   Acute pain of right shoulder  Comments:  X-ray today, can see Dr. Peñaloza also to ensure it isn't related to cervical radiculopathy  Orders:  -     X-Ray Shoulder 2 or More Views Left; Future; Expected date: 05/03/2022    Hypertension, unspecified type  Comments:  Chronic, stable, continue with nifedipine 90 mg once daily due to better compliance with q.d. dosing, continue to monitor at home  Orders:  -     NIFEdipine (PROCARDIA-XL) 90 MG (OSM) 24 hr tablet; Take 1 tablet (90 mg total) by mouth once daily.  Dispense: 30 tablet; Refill: 11    Rash  Comments:  Continue with famotidine and hydroxyzine, derm if not improving  Orders:  -     famotidine (PEPCID AC) 20 MG  tablet; Take 1 tablet (20 mg total) by mouth 2 (two) times daily. for 7 days  Dispense: 14 tablet; Refill: 0    Pruritus  Comments:  Continue with hydroxyzine as needed, derm if not improving  Orders:  -     famotidine (PEPCID AC) 20 MG tablet; Take 1 tablet (20 mg total) by mouth 2 (two) times daily. for 7 days  Dispense: 14 tablet; Refill: 0    Encounter for screening mammogram for malignant neoplasm of breast  Comments:  Orders placed for mammogram  Orders:  -     Mammo Digital Screening Bilat; Future; Expected date: 05/03/2022          No follow-ups on file.    There are no Patient Instructions on file for this visit.

## 2023-09-12 ENCOUNTER — OFFICE VISIT (OUTPATIENT)
Dept: INTERNAL MEDICINE | Facility: CLINIC | Age: 53
End: 2023-09-12
Payer: COMMERCIAL

## 2023-09-12 DIAGNOSIS — J45.30 MILD PERSISTENT ASTHMA WITHOUT COMPLICATION: ICD-10-CM

## 2023-09-12 DIAGNOSIS — M50.30 DDD (DEGENERATIVE DISC DISEASE), CERVICAL: ICD-10-CM

## 2023-09-12 DIAGNOSIS — Z98.1 STATUS POST CERVICAL SPINAL FUSION: ICD-10-CM

## 2023-09-12 DIAGNOSIS — I10 ESSENTIAL HYPERTENSION: Primary | ICD-10-CM

## 2023-09-12 DIAGNOSIS — L50.9 HIVES: ICD-10-CM

## 2023-09-12 PROCEDURE — 1160F PR REVIEW ALL MEDS BY PRESCRIBER/CLIN PHARMACIST DOCUMENTED: ICD-10-PCS | Mod: CPTII,95,, | Performed by: FAMILY MEDICINE

## 2023-09-12 PROCEDURE — 4010F ACE/ARB THERAPY RXD/TAKEN: CPT | Mod: CPTII,95,, | Performed by: FAMILY MEDICINE

## 2023-09-12 PROCEDURE — 1159F MED LIST DOCD IN RCRD: CPT | Mod: CPTII,95,, | Performed by: FAMILY MEDICINE

## 2023-09-12 PROCEDURE — 4010F PR ACE/ARB THEARPY RXD/TAKEN: ICD-10-PCS | Mod: CPTII,95,, | Performed by: FAMILY MEDICINE

## 2023-09-12 PROCEDURE — 99214 PR OFFICE/OUTPT VISIT, EST, LEVL IV, 30-39 MIN: ICD-10-PCS | Mod: 95,,, | Performed by: FAMILY MEDICINE

## 2023-09-12 PROCEDURE — 1159F PR MEDICATION LIST DOCUMENTED IN MEDICAL RECORD: ICD-10-PCS | Mod: CPTII,95,, | Performed by: FAMILY MEDICINE

## 2023-09-12 PROCEDURE — 1160F RVW MEDS BY RX/DR IN RCRD: CPT | Mod: CPTII,95,, | Performed by: FAMILY MEDICINE

## 2023-09-12 PROCEDURE — 99214 OFFICE O/P EST MOD 30 MIN: CPT | Mod: 95,,, | Performed by: FAMILY MEDICINE

## 2023-09-12 RX ORDER — DULOXETIN HYDROCHLORIDE 30 MG/1
30 CAPSULE, DELAYED RELEASE ORAL NIGHTLY
COMMUNITY
Start: 2023-09-07

## 2023-09-12 RX ORDER — LEVOCETIRIZINE DIHYDROCHLORIDE 5 MG/1
5 TABLET, FILM COATED ORAL NIGHTLY
Qty: 30 TABLET | Refills: 0 | Status: SHIPPED | OUTPATIENT
Start: 2023-09-12 | End: 2023-10-06

## 2023-09-12 RX ORDER — PREGABALIN 50 MG/1
50 CAPSULE ORAL
COMMUNITY
Start: 2023-09-11

## 2023-09-12 RX ORDER — LOSARTAN POTASSIUM 50 MG/1
50 TABLET ORAL DAILY
Qty: 90 TABLET | Refills: 0 | Status: SHIPPED | OUTPATIENT
Start: 2023-09-12 | End: 2024-01-02

## 2023-09-12 RX ORDER — METHOCARBAMOL 500 MG/1
500 TABLET, FILM COATED ORAL EVERY 8 HOURS PRN
COMMUNITY
Start: 2023-09-08

## 2023-09-12 RX ORDER — NIFEDIPINE 90 MG/1
90 TABLET, EXTENDED RELEASE ORAL DAILY
Qty: 90 TABLET | Refills: 0 | Status: SHIPPED | OUTPATIENT
Start: 2023-09-12 | End: 2024-01-02

## 2023-09-12 NOTE — PROGRESS NOTES
Subjective:       Patient ID: Konrad Christianson is a 52 y.o. female.    Chief Complaint: No chief complaint on file.    The patient location is: Home  The chief complaint leading to consultation is: Follow up     Visit type: audiovisual    Face to Face time with patient:   20 minutes of total time spent on the encounter, which includes face to face time and non-face to face time preparing to see the patient (eg, review of tests), Obtaining and/or reviewing separately obtained history, Documenting clinical information in the electronic or other health record, Independently interpreting results (not separately reported) and communicating results to the patient/family/caregiver, or Care coordination (not separately reported).         Each patient to whom he or she provides medical services by telemedicine is:  (1) informed of the relationship between the physician and patient and the respective role of any other health care provider with respect to management of the patient; and (2) notified that he or she may decline to receive medical services by telemedicine and may withdraw from such care at any time.    Notes:     52-year-old  female patient with Patient Active Problem List:     Rotator cuff injury     Myeloradiculopathy     DDD (degenerative disc disease), cervical     Bilateral shoulder bursitis     Superior glenoid labrum lesion of right shoulder     Status post cervical spinal fusion     S/P tendon repair     Essential hypertension     Mild persistent asthma without complication     Chest pain syndrome  Here for follow-up on blood pressure and reports that patient has been out of nifedipine and will be running out of losartan tomorrow-requesting refill on blood pressure medication  Patient will be establishing care with primary care physician, [place] she was followed by Ms Kathleen  Was unable to get an appointment  Denies any chest pain or difficulty breathing with palpitations and has  been followed by pain management in [place] secondary to chronic neck pain status post cervical fusion  Reports that patient started having hives off and on lately, has been taking meloxicam/naproxen as needed  Patient was recently started on methocarbamol in place of Flexeril and Lyrica has been sent to the pharmacy but patient has not picked up the prescription yet and has been taking gabapentin regularly  Continues to have chronic neck pain  Denies any trouble swallowing and has been having occasional hives off and on lately       Hypertension  This is a chronic problem. The current episode started more than 1 year ago. The problem is unchanged. The problem is resistant. Associated symptoms include anxiety, malaise/fatigue, neck pain and orthopnea. Pertinent negatives include no blurred vision, chest pain, headaches, palpitations, peripheral edema, PND, shortness of breath or sweats. There are no associated agents to hypertension. Risk factors for coronary artery disease include family history, sedentary lifestyle and stress. The current treatment provides mild improvement.     Review of Systems   Constitutional:  Positive for malaise/fatigue.   Eyes:  Negative for blurred vision.   Respiratory:  Negative for shortness of breath.    Cardiovascular:  Positive for orthopnea. Negative for chest pain, palpitations and PND.   Musculoskeletal:  Positive for neck pain.   Skin:  Positive for rash.   Neurological:  Negative for headaches.         LMP  (LMP Unknown) Comment: LMP in 2016  Objective:      Physical Exam  Skin:     Findings: Rash present.      Comments: Noted hives to left upper arm   Neurological:      Mental Status: She is alert and oriented to person, place, and time.   Psychiatric:         Mood and Affect: Mood normal.           Assessment/Plan:   1. Essential hypertension  -     losartan (COZAAR) 50 MG tablet; Take 1 tablet (50 mg total) by mouth once daily.  Dispense: 90 tablet; Refill: 0  -      NIFEdipine (PROCARDIA-XL) 90 MG (OSM) 24 hr tablet; Take 1 tablet (90 mg total) by mouth once daily.  Dispense: 90 tablet; Refill: 0  Will give refill on losartan 50 mg and Procardia 90 mg  Patient was encouraged to monitor blood pressure trends and restrict salt intake and follow-up with primary care physician 2-3 weeks-will be establishing care in [facility]    2. DDD (degenerative disc disease), cervical  4. Status post cervical spinal fusion  Overview:  Dr Peñaloza  Followed by pain management, currently taking Cymbalta methocarbamol and gabapentin  Advised to discontinue gabapentin once picks up Lyrica  Patient was encouraged to dense continue naproxen and meloxicam secondary to hives    3. Mild persistent asthma without complication  Stable      5. Hives  -     levocetirizine (XYZAL) 5 MG tablet; Take 1 tablet (5 mg total) by mouth every evening.  Dispense: 30 tablet; Refill: 0     Will get started on Xyzal to see if there is any improvement and discontinue prescription NSAIDs and over-the-counter NSAIDs like meloxicam and naproxen and see if there is any improvement  Continue to watch out for any food triggers and avoid scented soaps and lotions  If symptoms continue to persist patient to see allergy specialist  Patient verbalized understanding

## 2023-10-06 DIAGNOSIS — L50.9 HIVES: ICD-10-CM

## 2023-10-06 RX ORDER — LEVOCETIRIZINE DIHYDROCHLORIDE 5 MG/1
5 TABLET, FILM COATED ORAL NIGHTLY
Qty: 90 TABLET | Refills: 3 | Status: SHIPPED | OUTPATIENT
Start: 2023-10-06 | End: 2023-10-24 | Stop reason: SDUPTHER

## 2023-10-06 NOTE — TELEPHONE ENCOUNTER
Refill Routing Note   Medication(s) are not appropriate for processing by [facility] for the following reason(s):      Medication outside of protocol: no established PCP    [facility] action(s):    Care Due:  None identified   Medication Therapy Plan:         Appointments  past 12m or future 3m with PCP    Date Provider   Last Visit   9/12/2023 Fanny Hdez MD   Next Visit   Visit date not found Fanny Hdez MD   ED visits in past 90 days: 0        Note composed:10:04 AM 10/06/2023

## 2023-10-24 ENCOUNTER — OFFICE VISIT (OUTPATIENT)
Dept: PRIMARY CARE CLINIC | Facility: CLINIC | Age: 53
End: 2023-10-24
Payer: COMMERCIAL

## 2023-10-24 ENCOUNTER — PATIENT MESSAGE (OUTPATIENT)
Dept: PRIMARY CARE CLINIC | Facility: CLINIC | Age: 53
End: 2023-10-24

## 2023-10-24 VITALS
HEART RATE: 80 BPM | SYSTOLIC BLOOD PRESSURE: 130 MMHG | OXYGEN SATURATION: 99 % | BODY MASS INDEX: 27.47 KG/M2 | WEIGHT: 202.81 LBS | DIASTOLIC BLOOD PRESSURE: 74 MMHG | TEMPERATURE: 98 F | HEIGHT: 72 IN

## 2023-10-24 DIAGNOSIS — B35.3 TINEA PEDIS OF RIGHT FOOT: ICD-10-CM

## 2023-10-24 DIAGNOSIS — R23.2 HOT FLASHES: ICD-10-CM

## 2023-10-24 DIAGNOSIS — J30.2 SEASONAL ALLERGIES: ICD-10-CM

## 2023-10-24 DIAGNOSIS — Z00.00 LABORATORY EXAM ORDERED AS PART OF ROUTINE GENERAL MEDICAL EXAMINATION: ICD-10-CM

## 2023-10-24 DIAGNOSIS — Z76.89 ESTABLISHING CARE WITH NEW DOCTOR, ENCOUNTER FOR: Primary | ICD-10-CM

## 2023-10-24 DIAGNOSIS — L50.9 HIVES: ICD-10-CM

## 2023-10-24 DIAGNOSIS — L30.9 ECZEMA, UNSPECIFIED TYPE: ICD-10-CM

## 2023-10-24 PROCEDURE — 1160F RVW MEDS BY RX/DR IN RCRD: CPT | Mod: CPTII,S$GLB,, | Performed by: INTERNAL MEDICINE

## 2023-10-24 PROCEDURE — 3078F PR MOST RECENT DIASTOLIC BLOOD PRESSURE < 80 MM HG: ICD-10-PCS | Mod: CPTII,S$GLB,, | Performed by: INTERNAL MEDICINE

## 2023-10-24 PROCEDURE — 99999 PR PBB SHADOW E&M-EST. PATIENT-LVL IV: CPT | Mod: PBBFAC,,, | Performed by: INTERNAL MEDICINE

## 2023-10-24 PROCEDURE — 99214 OFFICE O/P EST MOD 30 MIN: CPT | Mod: S$GLB,,, | Performed by: INTERNAL MEDICINE

## 2023-10-24 PROCEDURE — 99999 PR PBB SHADOW E&M-EST. PATIENT-LVL IV: ICD-10-PCS | Mod: PBBFAC,,, | Performed by: INTERNAL MEDICINE

## 2023-10-24 PROCEDURE — 4010F ACE/ARB THERAPY RXD/TAKEN: CPT | Mod: CPTII,S$GLB,, | Performed by: INTERNAL MEDICINE

## 2023-10-24 PROCEDURE — 4010F PR ACE/ARB THEARPY RXD/TAKEN: ICD-10-PCS | Mod: CPTII,S$GLB,, | Performed by: INTERNAL MEDICINE

## 2023-10-24 PROCEDURE — 3075F PR MOST RECENT SYSTOLIC BLOOD PRESS GE 130-139MM HG: ICD-10-PCS | Mod: CPTII,S$GLB,, | Performed by: INTERNAL MEDICINE

## 2023-10-24 PROCEDURE — 1159F MED LIST DOCD IN RCRD: CPT | Mod: CPTII,S$GLB,, | Performed by: INTERNAL MEDICINE

## 2023-10-24 PROCEDURE — 99214 PR OFFICE/OUTPT VISIT, EST, LEVL IV, 30-39 MIN: ICD-10-PCS | Mod: S$GLB,,, | Performed by: INTERNAL MEDICINE

## 2023-10-24 PROCEDURE — 3008F BODY MASS INDEX DOCD: CPT | Mod: CPTII,S$GLB,, | Performed by: INTERNAL MEDICINE

## 2023-10-24 PROCEDURE — 3078F DIAST BP <80 MM HG: CPT | Mod: CPTII,S$GLB,, | Performed by: INTERNAL MEDICINE

## 2023-10-24 PROCEDURE — 3075F SYST BP GE 130 - 139MM HG: CPT | Mod: CPTII,S$GLB,, | Performed by: INTERNAL MEDICINE

## 2023-10-24 PROCEDURE — 1159F PR MEDICATION LIST DOCUMENTED IN MEDICAL RECORD: ICD-10-PCS | Mod: CPTII,S$GLB,, | Performed by: INTERNAL MEDICINE

## 2023-10-24 PROCEDURE — 1160F PR REVIEW ALL MEDS BY PRESCRIBER/CLIN PHARMACIST DOCUMENTED: ICD-10-PCS | Mod: CPTII,S$GLB,, | Performed by: INTERNAL MEDICINE

## 2023-10-24 PROCEDURE — 3008F PR BODY MASS INDEX (BMI) DOCUMENTED: ICD-10-PCS | Mod: CPTII,S$GLB,, | Performed by: INTERNAL MEDICINE

## 2023-10-24 RX ORDER — LEVOCETIRIZINE DIHYDROCHLORIDE 5 MG/1
5 TABLET, FILM COATED ORAL NIGHTLY
Qty: 90 TABLET | Refills: 3 | Status: SHIPPED | OUTPATIENT
Start: 2023-10-24

## 2023-10-24 RX ORDER — KETOCONAZOLE 20 MG/G
CREAM TOPICAL 2 TIMES DAILY
Qty: 60 G | Refills: 11 | Status: SHIPPED | OUTPATIENT
Start: 2023-10-24

## 2023-10-24 RX ORDER — TRIAMCINOLONE ACETONIDE 1 MG/G
OINTMENT TOPICAL 2 TIMES DAILY
Qty: 80 G | Refills: 5 | Status: SHIPPED | OUTPATIENT
Start: 2023-10-24

## 2023-10-24 NOTE — PROGRESS NOTES
Subjective     Patient ID: Konrad Christianson is a 53 y.o. female.    Chief Complaint: Establish Care and Medication Refill      HPI  here to establish care.  She needs a refill of Xyzal that she uses for hives and allergies.  It works really well for her.  She does have sensitive skin and eczema like patches.  This is not new.  She also has a history of athlete's feet and she would like a cream to help.  She is about to see her spine surgeon for follow-up.  She has a history of neck fusion in the past and she usually gets trigger point injections near her shoulders.  They are discussing possible neck and or shoulder injections versus procedures to help with her discomfort.    Past Medical History:   Diagnosis Date    Arthritis     rt knee    Asthma     Hx of anemia of chronic disorder     Hypertension     Migraines     Mitral valve prolapse     Sickle cell trait      Review of patient's allergies indicates:   Allergen Reactions    Lisinopril Swelling    Celebrex [celecoxib] Hives    Ibuprofen-famotidine Other (See Comments)    Meloxicam Other (See Comments)    Sulfate ion Hives and Itching    Morphine sulfate Nausea And Vomiting and Rash     Past Surgical History:   Procedure Laterality Date    ACDF  2/24/15    C4-5-6    kidney stone removal      TUBAL LIGATION      UTERINE FIBROID SURGERY       Family History   Problem Relation Age of Onset    Diabetes Mother         pre-diabetic    Mitral valve prolapse Mother      Social History     Socioeconomic History    Marital status:    Tobacco Use    Smoking status: Former     Current packs/day: 0.00     Types: Cigarettes     Quit date: 1998     Years since quittin.8    Smokeless tobacco: Never   Substance and Sexual Activity    Alcohol use: Yes     Comment: rarely, maybe once a month    Drug use: No    Sexual activity: Yes     Partners: Male     Birth control/protection: Surgical         /74   Pulse 80   Temp 97.6 °F (36.4 °C)   Ht 6' (1.829 m)   Wt  92 kg (202 lb 13.2 oz)   LMP  (LMP Unknown) Comment: LMP in 2016  SpO2 99%   BMI 27.51 kg/m²   Outpatient Medications as of 10/24/2023   Medication Sig Dispense Refill    acetaminophen (TYLENOL) 325 MG tablet Take 325 mg by mouth every 6 (six) hours as needed for Pain.      baclofen (LIORESAL) 10 MG tablet Take 10 mg by mouth once daily.      clotrimazole-betamethasone 1-0.05% (LOTRISONE) cream clotrimazole-betamethasone Apply apply 2 times per day for 710 days 20201203 cream 2 times per day No route recorded 710 days active 1-0.05 %      DULoxetine (CYMBALTA) 30 MG capsule Take 30 mg by mouth every evening.      famotidine (PEPCID AC) 20 MG tablet Take 1 tablet (20 mg total) by mouth 2 (two) times daily. for 7 days 14 tablet 0    furosemide (LASIX) 20 MG tablet Take 20 mg by mouth as needed (As needed for swelling of legs).       ketoconazole (NIZORAL) 2 % cream Apply topically 2 (two) times daily. 60 g 11    levocetirizine (XYZAL) 5 MG tablet Take 1 tablet (5 mg total) by mouth every evening. 90 tablet 3    losartan (COZAAR) 50 MG tablet Take 1 tablet (50 mg total) by mouth once daily. 90 tablet 0    methocarbamoL (ROBAXIN) 500 MG Tab Take 500 mg by mouth every 8 (eight) hours as needed.      NIFEdipine (PROCARDIA-XL) 90 MG (OSM) 24 hr tablet Take 1 tablet (90 mg total) by mouth once daily. 90 tablet 0    pregabalin (LYRICA) 50 MG capsule Take 50 mg by mouth.      triamcinolone acetonide 0.1% (KENALOG) 0.1 % ointment Apply topically 2 (two) times daily. 80 g 5     No current facility-administered medications on file as of 10/24/2023.       Review of Systems   All other systems reviewed and are negative.         Objective     Physical Exam  Constitutional:       General: She is not in acute distress.     Appearance: Normal appearance. She is not ill-appearing, toxic-appearing or diaphoretic.   HENT:      Head: Normocephalic and atraumatic.   Cardiovascular:      Rate and Rhythm: Normal rate.   Pulmonary:       Effort: Pulmonary effort is normal. No respiratory distress.   Skin:     General: Skin is dry.   Neurological:      General: No focal deficit present.      Mental Status: She is alert and oriented to person, place, and time.   Psychiatric:         Mood and Affect: Mood normal.         Behavior: Behavior normal.            Assessment and Plan     1. Establishing care with new doctor, encounter for    2. Hives  -     levocetirizine (XYZAL) 5 MG tablet; Take 1 tablet (5 mg total) by mouth every evening.  Dispense: 90 tablet; Refill: 3    3. Seasonal allergies  -     levocetirizine (XYZAL) 5 MG tablet; Take 1 tablet (5 mg total) by mouth every evening.  Dispense: 90 tablet; Refill: 3    4. Hot flashes  -     Follicle Stimulating Hormone; Future; Expected date: 10/24/2023    5. Laboratory exam ordered as part of routine general medical examination  -     Lipid Panel; Future; Expected date: 10/24/2023  -     Urinalysis; Future; Expected date: 10/24/2023  -     Hemoglobin A1C; Future; Expected date: 10/24/2023  -     TSH; Future; Expected date: 10/24/2023  -     Comprehensive Metabolic Panel; Future; Expected date: 10/24/2023  -     CBC Auto Differential; Future; Expected date: 10/24/2023  -     Follicle Stimulating Hormone; Future; Expected date: 10/24/2023    6. Eczema, unspecified type  -     triamcinolone acetonide 0.1% (KENALOG) 0.1 % ointment; Apply topically 2 (two) times daily.  Dispense: 80 g; Refill: 5    7. Tinea pedis of right foot  -     ketoconazole (NIZORAL) 2 % cream; Apply topically 2 (two) times daily.  Dispense: 60 g; Refill: 11         Labs before annual to discuss at visit.  She will schedule a pre op once she knows the dates for her upcoming possible procedures.    Immunization History   Administered Date(s) Administered    COVID-19, MRNA, LN-S, PF (MODERNA FULL 0.5 ML DOSE) 08/04/2021, 10/05/2021     I spent a total of 30 minutes on the day of the visit.This includes face to face time and non-face to  face time preparing to see the patient (eg, review of tests), obtaining and/or reviewing separately obtained history, documenting clinical information in the electronic or other health record, independently interpreting results and communicating results to the patient/family/caregiver, or care coordinator.

## 2023-11-01 ENCOUNTER — PATIENT MESSAGE (OUTPATIENT)
Dept: ADMINISTRATIVE | Facility: HOSPITAL | Age: 53
End: 2023-11-01
Payer: COMMERCIAL

## 2023-11-13 ENCOUNTER — LAB VISIT (OUTPATIENT)
Dept: LAB | Facility: HOSPITAL | Age: 53
End: 2023-11-13
Attending: INTERNAL MEDICINE
Payer: COMMERCIAL

## 2023-11-13 DIAGNOSIS — Z00.00 LABORATORY EXAM ORDERED AS PART OF ROUTINE GENERAL MEDICAL EXAMINATION: ICD-10-CM

## 2023-11-13 DIAGNOSIS — R23.2 HOT FLASHES: ICD-10-CM

## 2023-11-13 LAB
ALBUMIN SERPL BCP-MCNC: 3.7 G/DL (ref 3.5–5.2)
ALP SERPL-CCNC: 83 U/L (ref 55–135)
ALT SERPL W/O P-5'-P-CCNC: 13 U/L (ref 10–44)
ANION GAP SERPL CALC-SCNC: 12 MMOL/L (ref 8–16)
AST SERPL-CCNC: 17 U/L (ref 10–40)
BASOPHILS # BLD AUTO: 0.06 K/UL (ref 0–0.2)
BASOPHILS NFR BLD: 0.8 % (ref 0–1.9)
BILIRUB SERPL-MCNC: 0.5 MG/DL (ref 0.1–1)
BUN SERPL-MCNC: 8 MG/DL (ref 6–20)
CALCIUM SERPL-MCNC: 9.1 MG/DL (ref 8.7–10.5)
CHLORIDE SERPL-SCNC: 105 MMOL/L (ref 95–110)
CHOLEST SERPL-MCNC: 173 MG/DL (ref 120–199)
CHOLEST/HDLC SERPL: 4 {RATIO} (ref 2–5)
CO2 SERPL-SCNC: 22 MMOL/L (ref 23–29)
CREAT SERPL-MCNC: 0.8 MG/DL (ref 0.5–1.4)
DIFFERENTIAL METHOD: NORMAL
EOSINOPHIL # BLD AUTO: 0.1 K/UL (ref 0–0.5)
EOSINOPHIL NFR BLD: 1.3 % (ref 0–8)
ERYTHROCYTE [DISTWIDTH] IN BLOOD BY AUTOMATED COUNT: 13.8 % (ref 11.5–14.5)
EST. GFR  (NO RACE VARIABLE): >60 ML/MIN/1.73 M^2
ESTIMATED AVG GLUCOSE: 105 MG/DL (ref 68–131)
FSH SERPL-ACNC: 68.53 MIU/ML
GLUCOSE SERPL-MCNC: 83 MG/DL (ref 70–110)
HBA1C MFR BLD: 5.3 % (ref 4–5.6)
HCT VFR BLD AUTO: 39 % (ref 37–48.5)
HDLC SERPL-MCNC: 43 MG/DL (ref 40–75)
HDLC SERPL: 24.9 % (ref 20–50)
HGB BLD-MCNC: 13.4 G/DL (ref 12–16)
IMM GRANULOCYTES # BLD AUTO: 0.01 K/UL (ref 0–0.04)
IMM GRANULOCYTES NFR BLD AUTO: 0.1 % (ref 0–0.5)
LDLC SERPL CALC-MCNC: 108.8 MG/DL (ref 63–159)
LYMPHOCYTES # BLD AUTO: 2.1 K/UL (ref 1–4.8)
LYMPHOCYTES NFR BLD: 29.8 % (ref 18–48)
MCH RBC QN AUTO: 29.8 PG (ref 27–31)
MCHC RBC AUTO-ENTMCNC: 34.4 G/DL (ref 32–36)
MCV RBC AUTO: 87 FL (ref 82–98)
MONOCYTES # BLD AUTO: 0.4 K/UL (ref 0.3–1)
MONOCYTES NFR BLD: 5.8 % (ref 4–15)
NEUTROPHILS # BLD AUTO: 4.5 K/UL (ref 1.8–7.7)
NEUTROPHILS NFR BLD: 62.2 % (ref 38–73)
NONHDLC SERPL-MCNC: 130 MG/DL
NRBC BLD-RTO: 0 /100 WBC
PLATELET # BLD AUTO: 394 K/UL (ref 150–450)
PMV BLD AUTO: 11.5 FL (ref 9.2–12.9)
POTASSIUM SERPL-SCNC: 3.5 MMOL/L (ref 3.5–5.1)
PROT SERPL-MCNC: 7.4 G/DL (ref 6–8.4)
RBC # BLD AUTO: 4.5 M/UL (ref 4–5.4)
SODIUM SERPL-SCNC: 139 MMOL/L (ref 136–145)
TRIGL SERPL-MCNC: 106 MG/DL (ref 30–150)
TSH SERPL DL<=0.005 MIU/L-ACNC: 0.82 UIU/ML (ref 0.4–4)
WBC # BLD AUTO: 7.18 K/UL (ref 3.9–12.7)

## 2023-11-13 PROCEDURE — 36415 COLL VENOUS BLD VENIPUNCTURE: CPT | Mod: PN | Performed by: INTERNAL MEDICINE

## 2023-11-13 PROCEDURE — 83036 HEMOGLOBIN GLYCOSYLATED A1C: CPT | Performed by: INTERNAL MEDICINE

## 2023-11-13 PROCEDURE — 83001 ASSAY OF GONADOTROPIN (FSH): CPT | Performed by: INTERNAL MEDICINE

## 2023-11-13 PROCEDURE — 85025 COMPLETE CBC W/AUTO DIFF WBC: CPT | Performed by: INTERNAL MEDICINE

## 2023-11-13 PROCEDURE — 80053 COMPREHEN METABOLIC PANEL: CPT | Performed by: INTERNAL MEDICINE

## 2023-11-13 PROCEDURE — 84443 ASSAY THYROID STIM HORMONE: CPT | Performed by: INTERNAL MEDICINE

## 2023-11-13 PROCEDURE — 80061 LIPID PANEL: CPT | Performed by: INTERNAL MEDICINE

## 2023-11-22 DIAGNOSIS — Z12.31 OTHER SCREENING MAMMOGRAM: ICD-10-CM

## 2023-12-31 DIAGNOSIS — I10 ESSENTIAL HYPERTENSION: ICD-10-CM

## 2024-01-02 RX ORDER — NIFEDIPINE 90 MG/1
90 TABLET, EXTENDED RELEASE ORAL
Qty: 90 TABLET | Refills: 3 | Status: SHIPPED | OUTPATIENT
Start: 2024-01-02

## 2024-01-02 RX ORDER — LOSARTAN POTASSIUM 50 MG/1
50 TABLET ORAL
Qty: 90 TABLET | Refills: 3 | Status: SHIPPED | OUTPATIENT
Start: 2024-01-02

## 2024-01-02 NOTE — TELEPHONE ENCOUNTER
Refill Routing Note   Medication(s) are not appropriate for processing by [facility] for the following reason(s):        Non-participating provider    [facility] action(s):  Route               Appointments  past 12m or future 3m with PCP    Date Provider   Last Visit   9/12/2023 Fanny Hdez MD   Next Visit   Visit date not found Fanny Hdez MD   ED visits in past 90 days: 0        Note composed:2:42 PM 01/02/2024

## 2024-02-06 ENCOUNTER — PATIENT OUTREACH (OUTPATIENT)
Dept: ADMINISTRATIVE | Facility: HOSPITAL | Age: 54
End: 2024-02-06
Payer: COMMERCIAL

## 2024-02-06 DIAGNOSIS — Z12.11 COLON CANCER SCREENING: ICD-10-CM

## 2024-04-04 ENCOUNTER — OFFICE VISIT (OUTPATIENT)
Dept: PRIMARY CARE CLINIC | Facility: CLINIC | Age: 54
End: 2024-04-04
Payer: COMMERCIAL

## 2024-04-04 VITALS
SYSTOLIC BLOOD PRESSURE: 130 MMHG | HEIGHT: 72 IN | WEIGHT: 205 LBS | OXYGEN SATURATION: 99 % | HEART RATE: 87 BPM | BODY MASS INDEX: 27.77 KG/M2 | DIASTOLIC BLOOD PRESSURE: 82 MMHG | TEMPERATURE: 98 F

## 2024-04-04 DIAGNOSIS — I10 ESSENTIAL HYPERTENSION: ICD-10-CM

## 2024-04-04 DIAGNOSIS — J45.30 MILD PERSISTENT ASTHMA WITHOUT COMPLICATION: ICD-10-CM

## 2024-04-04 DIAGNOSIS — M50.30 DDD (DEGENERATIVE DISC DISEASE), CERVICAL: ICD-10-CM

## 2024-04-04 DIAGNOSIS — M25.562 ACUTE PAIN OF LEFT KNEE: Primary | ICD-10-CM

## 2024-04-04 PROCEDURE — 99999 PR PBB SHADOW E&M-EST. PATIENT-LVL IV: CPT | Mod: PBBFAC,,, | Performed by: NURSE PRACTITIONER

## 2024-04-04 PROCEDURE — 3075F SYST BP GE 130 - 139MM HG: CPT | Mod: CPTII,S$GLB,, | Performed by: NURSE PRACTITIONER

## 2024-04-04 PROCEDURE — 99214 OFFICE O/P EST MOD 30 MIN: CPT | Mod: S$GLB,,, | Performed by: NURSE PRACTITIONER

## 2024-04-04 PROCEDURE — 1159F MED LIST DOCD IN RCRD: CPT | Mod: CPTII,S$GLB,, | Performed by: NURSE PRACTITIONER

## 2024-04-04 PROCEDURE — 4010F ACE/ARB THERAPY RXD/TAKEN: CPT | Mod: CPTII,S$GLB,, | Performed by: NURSE PRACTITIONER

## 2024-04-04 PROCEDURE — 3079F DIAST BP 80-89 MM HG: CPT | Mod: CPTII,S$GLB,, | Performed by: NURSE PRACTITIONER

## 2024-04-04 PROCEDURE — 3008F BODY MASS INDEX DOCD: CPT | Mod: CPTII,S$GLB,, | Performed by: NURSE PRACTITIONER

## 2024-04-04 PROCEDURE — 1160F RVW MEDS BY RX/DR IN RCRD: CPT | Mod: CPTII,S$GLB,, | Performed by: NURSE PRACTITIONER

## 2024-04-04 RX ORDER — ACETAMINOPHEN AND CODEINE PHOSPHATE 300; 30 MG/1; MG/1
1 TABLET ORAL EVERY 4 HOURS PRN
Qty: 4 TABLET | Refills: 0 | Status: SHIPPED | OUTPATIENT
Start: 2024-04-04 | End: 2024-04-06

## 2024-04-04 NOTE — PROGRESS NOTES
Chief Complaint  Chief Complaint   Patient presents with    Knee Pain         HPI     HPI  Konrad Christianson is a 53 y.o. female with medical diagnoses as listed in the medical history and problem list that presents for Acute Left Knee Pain.  Pt is known to me with her last appointment 10/24/2023.      Acute Left Knee Pain: Started approx four days ago. Reports playing with grandkids over the past few days that she is attributing to pain. More weight bearing from physical activity than usually. An aggravating factor is when she bends the left knee. Pain is usually sharp at 7/10. 0/10 at rest. Prior marines with right knee arthritis. Allergies to NSAIDs. Reports tolerating morphine derivatives since initial allergic reaction       History     PAST MEDICAL HISTORY:  Past Medical History:   Diagnosis Date    Arthritis     rt knee    Asthma     Hx of anemia of chronic disorder     Hypertension     Migraines     Mitral valve prolapse     Sickle cell trait        PAST SURGICAL HISTORY:  Past Surgical History:   Procedure Laterality Date    ACDF  2/24/15    C4-5-6    kidney stone removal      TUBAL LIGATION      UTERINE FIBROID SURGERY         SOCIAL HISTORY:  Social History     Socioeconomic History    Marital status:    Tobacco Use    Smoking status: Former     Current packs/day: 0.00     Types: Cigarettes     Quit date: 1998     Years since quittin.2    Smokeless tobacco: Never   Substance and Sexual Activity    Alcohol use: Yes     Comment: rarely, maybe once a month    Drug use: No    Sexual activity: Yes     Partners: Male     Birth control/protection: Surgical     Social Determinants of Health     Financial Resource Strain: Low Risk  (2024)    Overall Financial Resource Strain (CARDIA)     Difficulty of Paying Living Expenses: Not very hard   Food Insecurity: No Food Insecurity (2024)    Hunger Vital Sign     Worried About Running Out of Food in the Last Year: Never true     Ran Out of Food in  the Last Year: Never true   Transportation Needs: No Transportation Needs (4/4/2024)    PRAPARE - Transportation     Lack of Transportation (Medical): No     Lack of Transportation (Non-Medical): No   Physical Activity: Unknown (4/4/2024)    Exercise Vital Sign     Days of Exercise per Week: 0 days   Stress: Stress Concern Present (4/4/2024)    [employer] [place] [employer] - Occupational Stress Questionnaire     Feeling of Stress : Very much   Social Connections: Unknown (4/4/2024)    Social Connection and Isolation Panel [NHANES]     Frequency of Communication with Friends and Family: More than three times a week     Frequency of Social Gatherings with Friends and Family: More than three times a week     Active Member of Clubs or Organizations: No     Attends Club or Organization Meetings: Never     Marital Status:    Housing Stability: Low Risk  (4/4/2024)    Housing Stability Vital Sign     Unable to Pay for Housing in the Last Year: No     Number of Places Lived in the Last Year: 1     Unstable Housing in the Last Year: No       FAMILY HISTORY:  Family History   Problem Relation Age of Onset    Diabetes Mother         pre-diabetic    Mitral valve prolapse Mother        ALLERGIES AND MEDICATIONS: updated and reviewed.  Review of patient's allergies indicates:   Allergen Reactions    Lisinopril Swelling    Celebrex [celecoxib] Hives    Ibuprofen-famotidine Other (See Comments)    Meloxicam Other (See Comments)    Sulfate ion Hives and Itching    Morphine sulfate Nausea And Vomiting and Rash     Current Outpatient Medications   Medication Sig Dispense Refill    acetaminophen (TYLENOL) 325 MG tablet Take 325 mg by mouth every 6 (six) hours as needed for Pain.      acetaminophen-codeine 300-30mg (TYLENOL #3) 300-30 mg Tab Take 1 tablet by mouth every 4 (four) hours as needed. 4 tablet 0    clotrimazole-betamethasone 1-0.05% (LOTRISONE) cream clotrimazole-betamethasone Apply apply 2 times per day  for 710 days [id number] cream 2 times per day No route recorded 710 days active 1-0.05 %      ketoconazole (NIZORAL) 2 % cream Apply topically 2 (two) times daily. 60 g 11    losartan (COZAAR) 50 MG tablet Take 1 tablet by mouth once daily 90 tablet 3    methocarbamoL (ROBAXIN) 500 MG Tab Take 500 mg by mouth every 8 (eight) hours as needed.      triamcinolone acetonide 0.1% (KENALOG) 0.1 % ointment Apply topically 2 (two) times daily. 80 g 5     No current facility-administered medications for this visit.           Exam     ROS  Review of Systems   Constitutional:  Positive for activity change and unexpected weight change.   HENT:  Negative for hearing loss, rhinorrhea and trouble swallowing.    Eyes:  Negative for discharge and visual disturbance.   Respiratory:  Negative for chest tightness and wheezing.    Cardiovascular:  Negative for chest pain and palpitations.   Gastrointestinal:  Negative for blood in stool, constipation, diarrhea and vomiting.   Endocrine: Negative for polydipsia and polyuria.   Genitourinary:  Negative for difficulty urinating, dysuria, hematuria and menstrual problem.   Musculoskeletal:  Positive for arthralgias and joint swelling.   Neurological:  Positive for weakness. Negative for headaches.   Psychiatric/Behavioral:  Negative for confusion and dysphoric mood.            Physical Exam  Vitals:    04/04/24 1455   BP: 130/82   Pulse: 87   Temp: 97.9 °F (36.6 °C)   SpO2: 99%   Weight: 93 kg (205 lb 0.4 oz)   Height: 6' (1.829 m)    Body mass index is 27.81 kg/m².  Weight: 93 kg (205 lb 0.4 oz)   Height: 6' (182.9 cm)   Physical Exam  Constitutional:       General: She is not in acute distress.     Appearance: Normal appearance.   HENT:      Head: Normocephalic and atraumatic.      Right Ear: External ear normal.      Left Ear: External ear normal.      Nose: Nose normal.   Eyes:      Pupils: Pupils are equal, round, and reactive to light.   Cardiovascular:      Rate and Rhythm: Normal rate  and regular rhythm.   Pulmonary:      Effort: Pulmonary effort is normal. No respiratory distress.      Breath sounds: Normal breath sounds. No wheezing.   Abdominal:      General: Bowel sounds are normal.      Palpations: Abdomen is soft.   Musculoskeletal:      Cervical back: Neck supple.      Left knee: Swelling present. Decreased range of motion. Tenderness present.   Lymphadenopathy:      Cervical: No cervical adenopathy.   Skin:     General: Skin is warm and dry.   Neurological:      Mental Status: She is alert and oriented to person, place, and time.   Psychiatric:         Mood and Affect: Mood normal.             Health Maintenance         Date Due Completion Date    TETANUS VACCINE Never done ---    Colorectal Cancer Screening Never done ---    Shingles Vaccine (1 of 2) Never done ---    Mammogram 11/21/2020 11/21/2019    Influenza Vaccine (1) Never done ---    COVID-19 Vaccine (3 - 2023-24 season) 09/01/2023 10/5/2021    Hemoglobin A1c (Diabetic Prevention Screening) 11/13/2026 11/13/2023    Lipid Panel 11/13/2028 11/13/2023              Assessment & Plan     Assessment & Plan  Problem List Items Addressed This Visit          Neuro    DDD (degenerative disc disease), cervical    Overview     Dr Peñaloza            Pulmonary    Mild persistent asthma without complication       Cardiac/Vascular    Essential hypertension     Other Visit Diagnoses       Acute pain of left knee    -  Primary  - We discussed safety when taking Tylenol 3#s  - Knee brace applied in clinic  - AVS printed with scheduled Left Knee Xray and Sports medicine appt time    Relevant Medications    acetaminophen-codeine 300-30mg (TYLENOL #3) 300-30 mg Tab    Other Relevant Orders    Ambulatory referral/consult to Sports Medicine    X-Ray Knee 3 View Left    URIC ACID    Comprehensive Metabolic Panel    CBC Auto Differential              Health Maintenance reviewed: Deferred at this time    Follow-up: If symptoms worsen or fail to improve      30+ minutes of total time spent on the encounter, which includes face to face time and non-face to face time preparing to see the patient (eg, review of tests), Obtaining and/or reviewing separately obtained history, documenting clinical information in the electronic or other health record, independently interpreting results (not separately reported) and communicating results to the patient/family/caregiver, or Care coordination (not separately reported).      Sincerely,  Pj Diaz NP

## 2024-04-05 ENCOUNTER — OFFICE VISIT (OUTPATIENT)
Dept: SPORTS MEDICINE | Facility: CLINIC | Age: 54
End: 2024-04-05
Payer: COMMERCIAL

## 2024-04-05 ENCOUNTER — HOSPITAL ENCOUNTER (OUTPATIENT)
Dept: RADIOLOGY | Facility: HOSPITAL | Age: 54
Discharge: HOME | End: 2024-04-05
Attending: NURSE PRACTITIONER
Payer: COMMERCIAL

## 2024-04-05 ENCOUNTER — PATIENT MESSAGE (OUTPATIENT)
Dept: PRIMARY CARE CLINIC | Facility: CLINIC | Age: 54
End: 2024-04-05
Payer: COMMERCIAL

## 2024-04-05 VITALS — HEIGHT: 72 IN | WEIGHT: 205 LBS | BODY MASS INDEX: 27.77 KG/M2 | RESPIRATION RATE: 17 BRPM

## 2024-04-05 DIAGNOSIS — M17.12 PRIMARY OSTEOARTHRITIS OF LEFT KNEE: Primary | ICD-10-CM

## 2024-04-05 DIAGNOSIS — M10.9 ACUTE GOUT OF LEFT KNEE, UNSPECIFIED CAUSE: ICD-10-CM

## 2024-04-05 DIAGNOSIS — M25.562 ACUTE PAIN OF LEFT KNEE: ICD-10-CM

## 2024-04-05 PROCEDURE — 20610 DRAIN/INJ JOINT/BURSA W/O US: CPT | Mod: LT,S$GLB,, | Performed by: PHYSICIAN ASSISTANT

## 2024-04-05 PROCEDURE — 99999 PR PBB SHADOW E&M-EST. PATIENT-LVL IV: CPT | Mod: PBBFAC,,, | Performed by: PHYSICIAN ASSISTANT

## 2024-04-05 PROCEDURE — 1159F MED LIST DOCD IN RCRD: CPT | Mod: CPTII,S$GLB,, | Performed by: PHYSICIAN ASSISTANT

## 2024-04-05 PROCEDURE — 73560 X-RAY EXAM OF KNEE 1 OR 2: CPT | Mod: 59,TC,PN,RT

## 2024-04-05 PROCEDURE — 99203 OFFICE O/P NEW LOW 30 MIN: CPT | Mod: 25,S$GLB,, | Performed by: PHYSICIAN ASSISTANT

## 2024-04-05 PROCEDURE — 3008F BODY MASS INDEX DOCD: CPT | Mod: CPTII,S$GLB,, | Performed by: PHYSICIAN ASSISTANT

## 2024-04-05 PROCEDURE — 1160F RVW MEDS BY RX/DR IN RCRD: CPT | Mod: CPTII,S$GLB,, | Performed by: PHYSICIAN ASSISTANT

## 2024-04-05 PROCEDURE — 73560 X-RAY EXAM OF KNEE 1 OR 2: CPT | Mod: 26,59,RT, | Performed by: RADIOLOGY

## 2024-04-05 PROCEDURE — 73562 X-RAY EXAM OF KNEE 3: CPT | Mod: 26,LT,, | Performed by: RADIOLOGY

## 2024-04-05 PROCEDURE — 4010F ACE/ARB THERAPY RXD/TAKEN: CPT | Mod: CPTII,S$GLB,, | Performed by: PHYSICIAN ASSISTANT

## 2024-04-05 RX ORDER — TRIAMCINOLONE ACETONIDE 40 MG/ML
40 INJECTION, SUSPENSION INTRA-ARTICULAR; INTRAMUSCULAR
Status: DISCONTINUED | OUTPATIENT
Start: 2024-04-05 | End: 2024-04-05 | Stop reason: HOSPADM

## 2024-04-05 RX ADMIN — TRIAMCINOLONE ACETONIDE 40 MG: 40 INJECTION, SUSPENSION INTRA-ARTICULAR; INTRAMUSCULAR at 02:04

## 2024-04-05 NOTE — PROCEDURES
Large Joint Aspiration/Injection: L knee    Date/Time: 4/5/2024 2:00 PM    Performed by: Margaret Leiva PA-C  Authorized by: Margaret Leiva PA-C    Consent Done?:  Yes (Verbal)  Indications:  Pain  Site marked: the procedure site was marked    Timeout: prior to procedure the correct patient, procedure, and site was verified    Prep: patient was prepped and draped in usual sterile fashion      Local anesthesia used?: Yes    Anesthesia:  Local infiltration  Local anesthetic:  Lidocaine 1% without epinephrine    Details:  Needle Size:  22 G  Ultrasonic Guidance for needle placement?: No    Approach:  Anterolateral  Location:  Knee  Site:  L knee  Medications:  40 mg triamcinolone acetonide 40 mg/mL  Aspirate amount (mL):  0  Patient tolerance:  Patient tolerated the procedure well with no immediate complications

## 2024-04-05 NOTE — PROGRESS NOTES
Orthopaedics Sports Medicine     Knee Initial Visit         4/5/2024    Referring MD: Pj Diaz NP    Chief Complaint   Patient presents with    Left Knee - Pain       History of Present Illness:   Konrad Christianson is a 53 y.o. year old female patient presents with pain and dysfunction involving the LEFT knee.     Onset of the symptoms was 1 week ago.     Inciting event:  No acute injury or trauma, patient reports she woke up with knee pain     Current symptoms include anterior and lateral knee pain, intermittent swelling.     Pain is aggravated by increased flexion of the knee, prolonged ambulation, prolonged standing.      Evaluation to date:  X-ray.     Treatment to date:  Rest, activity modification, over-the-counter Tylenol.       Past Medical History:   Past Medical History:   Diagnosis Date    Arthritis     rt knee    Asthma     Hx of anemia of chronic disorder     Hypertension     Migraines     Mitral valve prolapse     Sickle cell trait        Past Surgical History:   Past Surgical History:   Procedure Laterality Date    ACDF  2/24/15    C4-5-6    kidney stone removal      TUBAL LIGATION      UTERINE FIBROID SURGERY         Medications:  Patient's Medications   New Prescriptions    No medications on file   Previous Medications    ACETAMINOPHEN (TYLENOL) 325 MG TABLET    Take 325 mg by mouth every 6 (six) hours as needed for Pain.    ACETAMINOPHEN-CODEINE 300-30MG (TYLENOL #3) 300-30 MG TAB    Take 1 tablet by mouth every 4 (four) hours as needed.    CLOTRIMAZOLE-BETAMETHASONE 1-0.05% (LOTRISONE) CREAM    clotrimazole-betamethasone Apply apply 2 times per day for 710 days [id number] cream 2 times per day No route recorded 710 days active 1-0.05 %    KETOCONAZOLE (NIZORAL) 2 % CREAM    Apply topically 2 (two) times daily.    LOSARTAN (COZAAR) 50 MG TABLET    Take 1 tablet by mouth once daily    METHOCARBAMOL (ROBAXIN) 500 MG TAB    Take 500 mg by mouth every 8 (eight) hours as needed.    TRIAMCINOLONE  ACETONIDE 0.1% (KENALOG) 0.1 % OINTMENT    Apply topically 2 (two) times daily.   Modified Medications    No medications on file   Discontinued Medications    No medications on file        Allergies:   Review of patient's allergies indicates:   Allergen Reactions    Lisinopril Swelling    Celebrex [celecoxib] Hives    Ibuprofen-famotidine Other (See Comments)    Meloxicam Other (See Comments)    Sulfate ion Hives and Itching    Morphine sulfate Nausea And Vomiting and Rash       Social History:   [place]: [place]  occupation: disability, [employer]  alcohol use: She reports current alcohol use.  tobacco use: She reports that she quit smoking about 26 years ago. Her smoking use included cigarettes. She has never used smokeless tobacco.    Review of systems:  history of recent illness, fevers, shakes, or chills: no  history of cardiac problems or chest pain: htn  history of of pulmonary problems or asthma: mild asthma  history of diabetes: no  history of prior DVT or clotting problems: no  history of sleep apnea: no    Physical Examination:  Estimated body mass index is 27.81 kg/m² as calculated from the following:    Height as of this encounter: 6' (1.829 m).    Weight as of this encounter: 93 kg (205 lb 0.4 oz).    Standing exam  stance: normal alignment, no significant leg-length discrepancy  gait: antalgic, ambulates with cane      Knee      RIGHT  LEFT  Skin:     Intact   Intact  ROM:     0-130  0-110  Effusion:    Neg   +  Medial joint line tenderness:  Neg   Neg  Lateral joint line tenderness:  Neg   +  Latrice:     Neg   Neg  Patella crepitus:   Neg   Neg  Patella tenderness:   Neg   + tendon  Patella grind:      Neg   Neg  Lachman:    Neg   Neg  Valgus stress:    Neg   Neg  Varus stress:    Neg   Neg  Posterior drawer:   Neg   Neg  N-V               intact  intact  Hip:    nml    nml   Lower extremity edema: Negative negative    Neurovascular exam  - motor function grossly intact bilaterally to hip  flexion, knee extension and flexion, ankle dorsiflexion and plantarflexion  - sensation intact to light touch bilaterally to femoral, tibial, tibial and peroneal distributions  - symmetrical pedal pulses    Imaging:   XR Results:  EXAM:  XR KNEE ORTHO LEFT     CLINICAL HISTORY: Left knee pain.     FINDINGS: AP Standing, Flexion and sunrise view bilaterally and lateral view of the left knee.  No comparison.      No fracture is identified.  Joint alignment is anatomic.  No significant joint effusion.  Mild medial compartment narrowing and sclerosis bilaterally.        Impression:    No significant radiographic abnormality of the left knee.     Finalized on: 4/5/2024 2:26 PM By:  [place] Coffe[place] MD  [facility]# [id number]      2024-04-05 14:28:50.948    [facility]      Physician Read: I agree with the above impression.    Impression:  53 y.o. female with mild osteoarthritis of the left knee, possible gout flare    Plan:  Discussed diagnosis and treatment options with the patient today.  Her history, physical exam, and imaging is consistent with mild osteoarthritis of the left knee. Due to the amount of pain she is having on clinical exam, she could have a flare-up of gout as well  She has tried conservative treatment in the form of rest, activity modification, over-the-counter Tylenol (unable to take oral anti-inflammatories due to pre-existing medical conditions), knee brace. Despite these interventions she still continues to have left knee pain  For her acute pain today, I recommend we move forward with a left knee corticosteroid injection, patient tolerated the procedure well with no immediate complications  Encouraged her to continue using over the counter topicals such as Voltaren gel and Biofreeze over the weekend. She also has a prescription compound cream that she can use up to twice a day.  Okay to continue Tylenol, not to exceed 3000 mg of Tylenol per day  I would like for her return to clinic in 6 weeks to check progress.   I do think she had be a good candidate for viscosupplementation in the future if her pain persists  [facility] knee strengthening conditioning program postop to her MyChart for her to review and work on at home exercises  Follow-up with me in 6 weeks          Margaret Leiva PA-C  Sports Medicine Physician Assistant       Disclaimer: This note was prepared using a voice recognition system and is likely to have sound alike errors within the text.

## 2024-08-23 ENCOUNTER — PATIENT OUTREACH (OUTPATIENT)
Dept: ADMINISTRATIVE | Facility: HOSPITAL | Age: 54
End: 2024-08-23
Payer: COMMERCIAL

## 2024-08-23 NOTE — PROGRESS NOTES
VBHM Score: 2     Colon Cancer Screening  Mammogram             Health Maintenance Topic(s) Outreach Outcomes & Actions Taken:    Colorectal Cancer Screening - Outreach Outcomes & Actions Taken  : overdue    Breast Cancer Screening - Outreach Outcomes & Actions Taken  : overdue    Primary Care Appt - Outreach Outcomes & Actions Taken  : overdue for annual        Additional Notes:                 Care Management, Digital Medicine, and/or Education Referrals    OPCM Risk Score: 16.3         Next Steps - Referral Actions: no referrals placed         Additional Notes:

## 2024-09-26 ENCOUNTER — PATIENT OUTREACH (OUTPATIENT)
Dept: ADMINISTRATIVE | Facility: HOSPITAL | Age: 54
End: 2024-09-26
Payer: COMMERCIAL

## 2024-11-16 DIAGNOSIS — L50.9 HIVES: ICD-10-CM

## 2024-11-16 NOTE — TELEPHONE ENCOUNTER
Care Due:                  Date            Visit Type   Department     Provider  --------------------------------------------------------------------------------                                             [facility]  Last Visit: 10-      [facility]          YAZAN Flores  Next Visit: None Scheduled  None         None Found                                                            Last  Test          Frequency    Reason                     Performed    Due Date  --------------------------------------------------------------------------------    Office Visit  15 months..  losartan.................  10-   01-    CMP.........  12 months..  losartan.................  11- 11-    [employer] Embedded Care Due Messages. Reference number: [id number].   11/16/2024 5:33:03 PM CST

## 2024-11-17 RX ORDER — LEVOCETIRIZINE DIHYDROCHLORIDE 5 MG/1
5 TABLET, FILM COATED ORAL NIGHTLY
Qty: 90 TABLET | Refills: 0 | Status: SHIPPED | OUTPATIENT
Start: 2024-11-17

## 2024-11-17 NOTE — TELEPHONE ENCOUNTER
Refill Routing Note   Medication(s) are not appropriate for processing by [facility] for the following reason(s):      No active prescription written by provider    [facility] action(s):  Defer Care Due:  Appointment due  Labs due            Appointments  past 12m or future 3m with PCP    Date Provider   Last Visit   9/12/2023 Fanny Hdez MD   Next Visit   Visit date not found Fanny Hdez MD   ED visits in past 90 days: 0        Note composed:9:57 AM 11/17/2024

## 2025-01-23 DIAGNOSIS — I10 ESSENTIAL HYPERTENSION: ICD-10-CM

## 2025-01-23 RX ORDER — NIFEDIPINE 90 MG/1
90 TABLET, EXTENDED RELEASE ORAL
Qty: 90 TABLET | Refills: 0 | OUTPATIENT
Start: 2025-01-23

## 2025-01-23 NOTE — TELEPHONE ENCOUNTER
Care Due:                  Date            Visit Type   Department     Provider  --------------------------------------------------------------------------------                                             [facility]  Last Visit: 10-      [facility]           Delmy Flores  Next Visit: None Scheduled  None         None Found                                                            Last  Test          Frequency    Reason                     Performed    Due Date  --------------------------------------------------------------------------------    Office Visit  15 months..  levocetirizine, losartan.  10-   01-    [facility].........  12 months..  losartan.................  11- 11-    [facility] Embedded Care Due Messages. Reference number: [id number].   1/23/2025 3:14:17 PM CST

## 2025-04-16 ENCOUNTER — PATIENT OUTREACH (OUTPATIENT)
Dept: ADMINISTRATIVE | Facility: HOSPITAL | Age: 55
End: 2025-04-16
Payer: COMMERCIAL

## 2025-04-16 NOTE — PROGRESS NOTES
Working mammogram report; Chart searched; Attempted to contact patient, no answer, left voicemail. Will send portal message.

## 2025-04-21 ENCOUNTER — PATIENT OUTREACH (OUTPATIENT)
Dept: ADMINISTRATIVE | Facility: HOSPITAL | Age: 55
End: 2025-04-21
Payer: COMMERCIAL

## 2025-04-21 NOTE — PROGRESS NOTES
VBHM Score: 3     Colon Cancer Screening  Mammogram  Uncontrolled BP                 Tried calling patient, no answer. LVM and sent portal message.

## 2025-05-02 ENCOUNTER — PATIENT OUTREACH (OUTPATIENT)
Dept: ADMINISTRATIVE | Facility: HOSPITAL | Age: 55
End: 2025-05-02
Payer: COMMERCIAL

## 2025-06-17 ENCOUNTER — OFFICE VISIT (OUTPATIENT)
Dept: PRIMARY CARE CLINIC | Facility: CLINIC | Age: 55
End: 2025-06-17
Payer: COMMERCIAL

## 2025-06-17 DIAGNOSIS — L50.9 HIVES: ICD-10-CM

## 2025-06-17 DIAGNOSIS — Z09 FOLLOW-UP EXAM: Primary | ICD-10-CM

## 2025-06-17 DIAGNOSIS — Z00.00 ENCOUNTER FOR SCREENING AND PREVENTATIVE CARE: ICD-10-CM

## 2025-06-17 DIAGNOSIS — I10 ESSENTIAL HYPERTENSION: ICD-10-CM

## 2025-06-17 DIAGNOSIS — N95.1 MENOPAUSAL SYNDROME (HOT FLASHES): ICD-10-CM

## 2025-06-17 PROCEDURE — 3044F HG A1C LEVEL LT 7.0%: CPT | Mod: CPTII,95,, | Performed by: INTERNAL MEDICINE

## 2025-06-17 PROCEDURE — 98006 SYNCH AUDIO-VIDEO EST MOD 30: CPT | Mod: 95,,, | Performed by: INTERNAL MEDICINE

## 2025-06-17 PROCEDURE — G2211 COMPLEX E/M VISIT ADD ON: HCPCS | Mod: 95,,, | Performed by: INTERNAL MEDICINE

## 2025-06-17 PROCEDURE — 4010F ACE/ARB THERAPY RXD/TAKEN: CPT | Mod: CPTII,95,, | Performed by: INTERNAL MEDICINE

## 2025-06-17 PROCEDURE — 1159F MED LIST DOCD IN RCRD: CPT | Mod: CPTII,95,, | Performed by: INTERNAL MEDICINE

## 2025-06-17 PROCEDURE — 1160F RVW MEDS BY RX/DR IN RCRD: CPT | Mod: CPTII,95,, | Performed by: INTERNAL MEDICINE

## 2025-06-17 RX ORDER — LEVOCETIRIZINE DIHYDROCHLORIDE 5 MG/1
5 TABLET, FILM COATED ORAL NIGHTLY
Qty: 90 TABLET | Refills: 0 | Status: SHIPPED | OUTPATIENT
Start: 2025-06-17

## 2025-06-17 RX ORDER — LOSARTAN POTASSIUM 50 MG/1
50 TABLET ORAL DAILY
Qty: 90 TABLET | Refills: 3 | Status: SHIPPED | OUTPATIENT
Start: 2025-06-17

## 2025-06-17 NOTE — TELEPHONE ENCOUNTER
No care due was identified.  [facility] Embedded Care Due Messages. Reference number: [id number].   6/17/2025 2:17:14 PM CDT

## 2025-06-17 NOTE — TELEPHONE ENCOUNTER
Refill Routing Note   Medication(s) are not appropriate for processing by [facility] for the following reason(s):        No active prescription written by provider    [facility] action(s):  Defer             Appointments  past 12m or future 3m with PCP    Date Provider   Last Visit   6/17/2025 Delmy Flores MD   Next Visit   Visit date not found Delmy Flores MD   ED visits in past 90 days: 0        Note composed:2:42 PM 06/17/2025

## 2025-06-17 NOTE — PROGRESS NOTES
The patient location is: [place]  The chief complaint leading to consultation is: f/u    Visit type: audiovisual    Face to Face time with patient:   20 minutes of total time spent on the encounter, which includes face to face time and non-face to face time preparing to see the patient (eg, review of tests), Obtaining and/or reviewing separately obtained history, Documenting clinical information in the electronic or other health record, Independently interpreting results (not separately reported) and communicating results to the patient/family/caregiver, or Care coordination (not separately reported).         Each patient to whom he or she provides medical services by telemedicine is:  (1) informed of the relationship between the physician and patient and the respective role of any other health care provider with respect to management of the patient; and (2) notified that he or she may decline to receive medical services by telemedicine and may withdraw from such care at any time.    Notes:     Answers submitted by the patient for this visit:  High Blood Pressure Questionnaire (Submitted on 6/17/2025)  Chief Complaint: Hypertension  Chronicity: chronic  Onset: more than 1 year ago  Progression since onset: unchanged  Condition status: controlled  anxiety: Yes  blurred vision: No  chest pain: No  headaches: Yes  malaise/fatigue: Yes  neck pain: Yes  orthopnea: Yes  palpitations: No  peripheral edema: No  PND: No  shortness of breath: No  sweats: Yes  Agents associated with hypertension: steroids  CAD risks: family history, post-menopausal state, sedentary lifestyle, stress  Compliance problems: no compliance problems  Improvement on treatment: significant    Subjective     Patient ID: Konrad Christianson is a 54 y.o. female.    Chief Complaint: No chief complaint on file.      History of Present Illness      F/u htn.  Controlled.  Needs refills.  No issues with medication.  She has some concerns regarding hot flashes, labs and  if menopausal.  This was d/w pt in detail.       Past Medical History:   Diagnosis Date    Arthritis     rt knee    Asthma     Hx of anemia of chronic disorder     Hypertension     Migraines     Mitral valve prolapse     Sickle cell trait      Review of patient's allergies indicates:   Allergen Reactions    Lisinopril Swelling    Celebrex [celecoxib] Hives    Ibuprofen-famotidine Other (See Comments)    Meloxicam Other (See Comments)    Sulfate ion Hives and Itching    Morphine sulfate Nausea And Vomiting and Rash     Past Surgical History:   Procedure Laterality Date    ACDF  2/24/15    C4-5-6    kidney stone removal      TUBAL LIGATION      UTERINE FIBROID SURGERY       Family History   Problem Relation Name Age of Onset    Diabetes Mother          pre-diabetic    Mitral valve prolapse Mother       Social History[1]      LMP  (LMP Unknown) Comment: LMP in 2016  Outpatient Medications as of 6/17/2025   Medication Sig Dispense Refill    acetaminophen (TYLENOL) 325 MG tablet Take 325 mg by mouth every 6 (six) hours as needed for Pain.      clotrimazole-betamethasone 1-0.05% (LOTRISONE) cream clotrimazole-betamethasone Apply apply 2 times per day for 710 days 20201203 cream 2 times per day No route recorded 710 days active 1-0.05 %      ketoconazole (NIZORAL) 2 % cream Apply topically 2 (two) times daily. 60 g 11    methocarbamoL (ROBAXIN) 500 MG Tab Take 500 mg by mouth every 8 (eight) hours as needed.      triamcinolone acetonide 0.1% (KENALOG) 0.1 % ointment Apply topically 2 (two) times daily. 80 g 5     No current facility-administered medications on file as of 6/17/2025.       Otw a full 10 point ros is negative.       Objective     Physical Exam      A&O  Nad  Mood pleasant and appropriate            Assessment and Plan     1. Follow-up exam    2. Encounter for screening and preventative care  -     Lipid Panel; Future; Expected date: 06/17/2025  -     Urinalysis; Future; Expected date: 06/17/2025  -      Hemoglobin A1C; Future; Expected date: 06/17/2025  -     TSH; Future; Expected date: 06/17/2025  -     Comprehensive Metabolic Panel; Future; Expected date: 06/17/2025  -     CBC Auto Differential; Future; Expected date: 06/17/2025  -     Follicle Stimulating Hormone; Future; Expected date: 06/17/2025  -     Estrogens, Total; Future; Expected date: 06/17/2025    3. Menopausal syndrome (hot flashes)  -     Follicle Stimulating Hormone; Future; Expected date: 06/17/2025  -     Estrogens, Total; Future; Expected date: 06/17/2025    4. Hives    5. Essential hypertension  -     losartan (COZAAR) 50 MG tablet; Take 1 tablet (50 mg total) by mouth once daily.  Dispense: 90 tablet; Refill: 3         Labs before physical    Immunization History   Administered Date(s) Administered    COVID-19, MRNA, LN-S, PF (MODERNA FULL 0.5 ML DOSE) 08/04/2021, 10/05/2021     This includes face to face time and non-face to face time preparing to see the patient (eg, review of tests), obtaining and/or reviewing separately obtained history, documenting clinical information in the electronic or other health record, independently interpreting results and communicating results to the patient/family/caregiver, or care coordinator.    Visit today included increased complexity associated with the care of the episodic problem hot flashes addressed and managing the longitudinal care of the patient due to the serious and/or complex managed problem(s) htn.             This note was generated with the assistance of ambient listening technology. Verbal consent was obtained by the patient and accompanying visitor(s) for the recording of patient appointment to facilitate this note. I attest to having reviewed and edited the generated note for accuracy, though some syntax or spelling errors may persist. Please contact the author of this note for any clarification.           [1]   Social History  Socioeconomic History    Marital status:    Tobacco  Use    Smoking status: Former     Current packs/day: 0.00     Types: Cigarettes     Quit date: 1998     Years since quittin.4    Smokeless tobacco: Never   Substance and Sexual Activity    Alcohol use: Yes     Comment: rarely, maybe once a month    Drug use: No    Sexual activity: Yes     Partners: Male     Birth control/protection: Surgical     Social Drivers of Health     Financial Resource Strain: Low Risk  (2025)    Overall Financial Resource Strain (CARDIA)     Difficulty of Paying Living Expenses: Not very hard   Food Insecurity: Food Insecurity Present (2025)    Hunger Vital Sign     Worried About Running Out of Food in the Last Year: Sometimes true     Ran Out of Food in the Last Year: Sometimes true   Transportation Needs: Unmet Transportation Needs (2025)    PRAPARE - Transportation     Lack of Transportation (Medical): Yes     Lack of Transportation (Non-Medical): Yes   Physical Activity: Unknown (2025)    Exercise Vital Sign     Days of Exercise per Week: 0 days   Stress: Stress Concern Present (2025)    [employer] - Occupational Stress Questionnaire     Feeling of Stress : To some extent   Housing Stability: Low Risk  (2025)    Housing Stability Vital Sign     Unable to Pay for Housing in the Last Year: No     Homeless in the Last Year: No

## 2025-06-18 ENCOUNTER — LAB VISIT (OUTPATIENT)
Dept: LAB | Facility: HOSPITAL | Age: 55
End: 2025-06-18
Attending: INTERNAL MEDICINE
Payer: COMMERCIAL

## 2025-06-18 DIAGNOSIS — Z00.00 ENCOUNTER FOR SCREENING AND PREVENTATIVE CARE: ICD-10-CM

## 2025-06-18 DIAGNOSIS — N95.1 MENOPAUSAL SYNDROME (HOT FLASHES): ICD-10-CM

## 2025-06-18 LAB
ABSOLUTE EOSINOPHIL (OHS): 0.09 K/UL
ABSOLUTE MONOCYTE (OHS): 0.5 K/UL (ref 0.3–1)
ABSOLUTE NEUTROPHIL COUNT (OHS): 5.24 K/UL (ref 1.8–7.7)
ALBUMIN SERPL BCP-MCNC: 4 G/DL (ref 3.5–5.2)
ALP SERPL-CCNC: 75 UNIT/L (ref 40–150)
ALT SERPL W/O P-5'-P-CCNC: 13 UNIT/L (ref 10–44)
ANION GAP (OHS): 11 MMOL/L (ref 8–16)
AST SERPL-CCNC: 17 UNIT/L (ref 11–45)
BASOPHILS # BLD AUTO: 0.06 K/UL
BASOPHILS NFR BLD AUTO: 0.8 %
BILIRUB SERPL-MCNC: 0.3 MG/DL (ref 0.1–1)
BILIRUB UR QL STRIP.AUTO: NEGATIVE
BUN SERPL-MCNC: 11 MG/DL (ref 6–20)
CALCIUM SERPL-MCNC: 9.2 MG/DL (ref 8.7–10.5)
CHLORIDE SERPL-SCNC: 104 MMOL/L (ref 95–110)
CHOLEST SERPL-MCNC: 179 MG/DL (ref 120–199)
CHOLEST/HDLC SERPL: 3.9 {RATIO} (ref 2–5)
CLARITY UR: CLEAR
CO2 SERPL-SCNC: 23 MMOL/L (ref 23–29)
COLOR UR AUTO: YELLOW
CREAT SERPL-MCNC: 0.8 MG/DL (ref 0.5–1.4)
EAG (OHS): 100 MG/DL (ref 68–131)
ERYTHROCYTE [DISTWIDTH] IN BLOOD BY AUTOMATED COUNT: 13.6 % (ref 11.5–14.5)
FSH SERPL-ACNC: 128.95 MIU/ML
GFR SERPLBLD CREATININE-BSD FMLA CKD-EPI: >60 ML/MIN/1.73/M2
GLUCOSE SERPL-MCNC: 77 MG/DL (ref 70–110)
GLUCOSE UR QL STRIP: NEGATIVE
HBA1C MFR BLD: 5.1 % (ref 4–5.6)
HCT VFR BLD AUTO: 37.4 % (ref 37–48.5)
HDLC SERPL-MCNC: 46 MG/DL (ref 40–75)
HDLC SERPL: 25.7 % (ref 20–50)
HGB BLD-MCNC: 12.3 GM/DL (ref 12–16)
HGB UR QL STRIP: NEGATIVE
IMM GRANULOCYTES # BLD AUTO: 0.02 K/UL (ref 0–0.04)
IMM GRANULOCYTES NFR BLD AUTO: 0.3 % (ref 0–0.5)
KETONES UR QL STRIP: NEGATIVE
LDLC SERPL CALC-MCNC: 107 MG/DL (ref 63–159)
LEUKOCYTE ESTERASE UR QL STRIP: NEGATIVE
LYMPHOCYTES # BLD AUTO: 2.08 K/UL (ref 1–4.8)
MCH RBC QN AUTO: 29.3 PG (ref 27–31)
MCHC RBC AUTO-ENTMCNC: 32.9 G/DL (ref 32–36)
MCV RBC AUTO: 89 FL (ref 82–98)
NITRITE UR QL STRIP: NEGATIVE
NONHDLC SERPL-MCNC: 133 MG/DL
NUCLEATED RBC (/100WBC) (OHS): 0 /100 WBC
PH UR STRIP: 7 [PH]
PLATELET # BLD AUTO: 376 K/UL (ref 150–450)
PMV BLD AUTO: 11.2 FL (ref 9.2–12.9)
POTASSIUM SERPL-SCNC: 3.8 MMOL/L (ref 3.5–5.1)
PROT SERPL-MCNC: 7.5 GM/DL (ref 6–8.4)
PROT UR QL STRIP: NEGATIVE
RBC # BLD AUTO: 4.2 M/UL (ref 4–5.4)
RELATIVE EOSINOPHIL (OHS): 1.1 %
RELATIVE LYMPHOCYTE (OHS): 26 % (ref 18–48)
RELATIVE MONOCYTE (OHS): 6.3 % (ref 4–15)
RELATIVE NEUTROPHIL (OHS): 65.5 % (ref 38–73)
SODIUM SERPL-SCNC: 138 MMOL/L (ref 136–145)
SP GR UR STRIP: 1.01
TRIGL SERPL-MCNC: 130 MG/DL (ref 30–150)
TSH SERPL-ACNC: 0.94 UIU/ML (ref 0.4–4)
UROBILINOGEN UR STRIP-ACNC: NEGATIVE EU/DL
WBC # BLD AUTO: 7.99 K/UL (ref 3.9–12.7)

## 2025-06-18 PROCEDURE — 83036 HEMOGLOBIN GLYCOSYLATED A1C: CPT

## 2025-06-18 PROCEDURE — 36415 COLL VENOUS BLD VENIPUNCTURE: CPT | Mod: PN

## 2025-06-18 PROCEDURE — 85025 COMPLETE CBC W/AUTO DIFF WBC: CPT

## 2025-06-18 PROCEDURE — 80061 LIPID PANEL: CPT

## 2025-06-18 PROCEDURE — 81003 URINALYSIS AUTO W/O SCOPE: CPT

## 2025-06-18 PROCEDURE — 82672 ASSAY OF ESTROGEN: CPT

## 2025-06-18 PROCEDURE — 83001 ASSAY OF GONADOTROPIN (FSH): CPT

## 2025-06-18 PROCEDURE — 80053 COMPREHEN METABOLIC PANEL: CPT

## 2025-06-18 PROCEDURE — 84443 ASSAY THYROID STIM HORMONE: CPT

## 2025-06-19 ENCOUNTER — RESULTS FOLLOW-UP (OUTPATIENT)
Dept: PRIMARY CARE CLINIC | Facility: CLINIC | Age: 55
End: 2025-06-19

## 2025-06-24 LAB
ESTRADIOL: 5 PG/ML
ESTROGEN SERPL-MCNC: 32 PG/ML
ESTRONE PG/ML: 27 PG/ML

## 2025-07-29 ENCOUNTER — PATIENT MESSAGE (OUTPATIENT)
Dept: ADMINISTRATIVE | Facility: HOSPITAL | Age: 55
End: 2025-07-29
Payer: COMMERCIAL

## 2025-07-30 ENCOUNTER — PATIENT OUTREACH (OUTPATIENT)
Dept: ADMINISTRATIVE | Facility: HOSPITAL | Age: 55
End: 2025-07-30
Payer: COMMERCIAL

## 2025-08-29 ENCOUNTER — PATIENT MESSAGE (OUTPATIENT)
Dept: ADMINISTRATIVE | Facility: HOSPITAL | Age: 55
End: 2025-08-29
Payer: COMMERCIAL